# Patient Record
Sex: MALE | Race: WHITE | NOT HISPANIC OR LATINO | Employment: FULL TIME | URBAN - METROPOLITAN AREA
[De-identification: names, ages, dates, MRNs, and addresses within clinical notes are randomized per-mention and may not be internally consistent; named-entity substitution may affect disease eponyms.]

---

## 2017-05-31 ENCOUNTER — LAB VISIT (OUTPATIENT)
Dept: LAB | Facility: HOSPITAL | Age: 42
End: 2017-05-31
Attending: FAMILY MEDICINE
Payer: COMMERCIAL

## 2017-05-31 ENCOUNTER — OFFICE VISIT (OUTPATIENT)
Dept: FAMILY MEDICINE | Facility: CLINIC | Age: 42
End: 2017-05-31
Payer: COMMERCIAL

## 2017-05-31 VITALS
HEART RATE: 90 BPM | SYSTOLIC BLOOD PRESSURE: 100 MMHG | TEMPERATURE: 98 F | DIASTOLIC BLOOD PRESSURE: 86 MMHG | WEIGHT: 198.44 LBS | BODY MASS INDEX: 27.78 KG/M2 | HEIGHT: 71 IN

## 2017-05-31 DIAGNOSIS — R20.0 NUMBNESS AND TINGLING OF RIGHT HAND: ICD-10-CM

## 2017-05-31 DIAGNOSIS — R20.2 NUMBNESS AND TINGLING OF RIGHT HAND: Primary | ICD-10-CM

## 2017-05-31 DIAGNOSIS — R20.2 NUMBNESS AND TINGLING OF RIGHT HAND: ICD-10-CM

## 2017-05-31 DIAGNOSIS — R20.0 NUMBNESS AND TINGLING OF RIGHT HAND: Primary | ICD-10-CM

## 2017-05-31 LAB
ALBUMIN SERPL BCP-MCNC: 3.6 G/DL
ALP SERPL-CCNC: 81 U/L
ALT SERPL W/O P-5'-P-CCNC: 52 U/L
ANION GAP SERPL CALC-SCNC: 9 MMOL/L
AST SERPL-CCNC: 29 U/L
BASOPHILS # BLD AUTO: 0.03 K/UL
BASOPHILS NFR BLD: 0.4 %
BILIRUB SERPL-MCNC: 0.3 MG/DL
BUN SERPL-MCNC: 21 MG/DL
CALCIUM SERPL-MCNC: 9.6 MG/DL
CHLORIDE SERPL-SCNC: 101 MMOL/L
CO2 SERPL-SCNC: 27 MMOL/L
CREAT SERPL-MCNC: 1.1 MG/DL
DIFFERENTIAL METHOD: ABNORMAL
EOSINOPHIL # BLD AUTO: 0.2 K/UL
EOSINOPHIL NFR BLD: 2.6 %
ERYTHROCYTE [DISTWIDTH] IN BLOOD BY AUTOMATED COUNT: 12.9 %
EST. GFR  (AFRICAN AMERICAN): >60 ML/MIN/1.73 M^2
EST. GFR  (NON AFRICAN AMERICAN): >60 ML/MIN/1.73 M^2
FERRITIN SERPL-MCNC: 223 NG/ML
GLUCOSE SERPL-MCNC: 102 MG/DL
HCT VFR BLD AUTO: 44.5 %
HGB BLD-MCNC: 15.1 G/DL
IRON SERPL-MCNC: 55 UG/DL
LYMPHOCYTES # BLD AUTO: 1.6 K/UL
LYMPHOCYTES NFR BLD: 19.5 %
MCH RBC QN AUTO: 30.8 PG
MCHC RBC AUTO-ENTMCNC: 33.9 %
MCV RBC AUTO: 91 FL
MONOCYTES # BLD AUTO: 0.6 K/UL
MONOCYTES NFR BLD: 7.1 %
NEUTROPHILS # BLD AUTO: 5.9 K/UL
NEUTROPHILS NFR BLD: 70 %
PLATELET # BLD AUTO: 274 K/UL
PMV BLD AUTO: 8.9 FL
POTASSIUM SERPL-SCNC: 4.2 MMOL/L
PROT SERPL-MCNC: 8.1 G/DL
RBC # BLD AUTO: 4.9 M/UL
RETICS/RBC NFR AUTO: 1.1 %
SATURATED IRON: 15 %
SODIUM SERPL-SCNC: 137 MMOL/L
TOTAL IRON BINDING CAPACITY: 373 UG/DL
TRANSFERRIN SERPL-MCNC: 252 MG/DL
WBC # BLD AUTO: 8.4 K/UL

## 2017-05-31 PROCEDURE — 85045 AUTOMATED RETICULOCYTE COUNT: CPT

## 2017-05-31 PROCEDURE — 85025 COMPLETE CBC W/AUTO DIFF WBC: CPT

## 2017-05-31 PROCEDURE — 36415 COLL VENOUS BLD VENIPUNCTURE: CPT | Mod: PO

## 2017-05-31 PROCEDURE — 82607 VITAMIN B-12: CPT

## 2017-05-31 PROCEDURE — 99203 OFFICE O/P NEW LOW 30 MIN: CPT | Mod: S$GLB,,, | Performed by: NURSE PRACTITIONER

## 2017-05-31 PROCEDURE — 82728 ASSAY OF FERRITIN: CPT

## 2017-05-31 PROCEDURE — 80053 COMPREHEN METABOLIC PANEL: CPT

## 2017-05-31 PROCEDURE — 83540 ASSAY OF IRON: CPT

## 2017-05-31 PROCEDURE — 99999 PR PBB SHADOW E&M-NEW PATIENT-LVL III: CPT | Mod: PBBFAC,,, | Performed by: NURSE PRACTITIONER

## 2017-05-31 RX ORDER — CIPROFLOXACIN 500 MG/1
500 TABLET ORAL 2 TIMES DAILY
Refills: 0 | COMMUNITY
Start: 2017-05-26 | End: 2017-11-27

## 2017-05-31 RX ORDER — DIPHENOXYLATE HYDROCHLORIDE AND ATROPINE SULFATE 2.5; .025 MG/1; MG/1
1 TABLET ORAL 2 TIMES DAILY
Refills: 0 | COMMUNITY
Start: 2017-04-18

## 2017-05-31 NOTE — PROGRESS NOTES
"Subjective:       Patient ID: Al Baker is a 42 y.o. male.    Chief Complaint: Hand Pain (right hand pinkie and ring fingers)    Pt is new to this clinic and to this provider.  Pt here c/o numbness of right 4th and little finger for the past 3-4 days.  Pt states he had sx prior to starting Cipro on Monday, sx have not worsened.  Pt states that he has had this problem on an intermittent basis for a while.  No known trauma.  No previous neck injury.  Pt is RHD/ works in sales.  Pt states that he has had the sweats the past 3-4 nights none last night.  Pt denies any fever.  No n/v.    Pt does not have a Primary Care provider, has only seen GI         Past Medical History:   Diagnosis Date    Anxiety     previously treated    Asthma     intermittent no meds    Crohn disease     treated by Dr Smith at , on Remicade IV q6 weeks     Past Surgical History:   Procedure Laterality Date    COLON SURGERY       Social History     Social History Narrative    Works in sales    On Cipro from GI for Crohn's      History reviewed. No pertinent family history.  Vitals:    05/31/17 1607   BP: 100/86   Pulse: 90   Temp: 97.9 °F (36.6 °C)   Weight: 90 kg (198 lb 6.6 oz)   Height: 5' 11" (1.803 m)   PainSc:   3     Outpatient Encounter Prescriptions as of 5/31/2017   Medication Sig Dispense Refill    INFLIXIMAB (REMICADE IV) Inject into the vein. Once every 6 weeks      ciprofloxacin HCl (CIPRO) 500 MG tablet Take 500 mg by mouth 2 (two) times daily.  0    diphenoxylate-atropine 2.5-0.025 mg (LOMOTIL) 2.5-0.025 mg per tablet Take 1 tablet by mouth 2 (two) times daily.  0     No facility-administered encounter medications on file as of 5/31/2017.      Wt Readings from Last 3 Encounters:   05/31/17 90 kg (198 lb 6.6 oz)     Last 3 sets of Vitals    Vitals - 1 value per visit 5/31/2017   SYSTOLIC 100   DIASTOLIC 86   PULSE 90   TEMPERATURE 97.9   Weight (lb) 198.41   Weight (kg) 90   HEIGHT 5' 11"   BODY MASS INDEX " 27.67   VISIT REPORT -   Pain Score  3     No results found for: CBC  Review of Systems   Constitutional: Positive for diaphoresis. Negative for fatigue and fever.   Respiratory: Negative for cough.    Skin: Negative for rash.   Neurological: Positive for numbness. Negative for headaches.       Objective:      Physical Exam   Constitutional: He is oriented to person, place, and time. He appears well-developed and well-nourished. No distress.   HENT:   Head: Normocephalic and atraumatic.   Pulmonary/Chest: Effort normal.   Musculoskeletal:        Right hand: He exhibits normal range of motion, no tenderness, no bony tenderness, normal capillary refill, no deformity, no laceration and no swelling. Decreased sensation noted. Decreased sensation is present in the ulnar distribution. Normal strength noted.   Neg Steve test  Gross motor intact  Neg Phalen's and Tinel's     Neurological: He is alert and oriented to person, place, and time.   Skin: Skin is warm and dry. He is not diaphoretic.   Nursing note and vitals reviewed.      Assessment:       1. Numbness and tingling of right hand        Plan:       Will check labs , ? B12 deficiency  KIKO for records from Dr Smith  Needs to have an annual and establish care with PCP of choice  Will contact pt with results when available  Pt has f/u scheduled with GI for next week, encouraged to go sooner for any fever/ chills.  Discussed poss tendinopathy with Shakila Melendez was seen today for hand pain.    Diagnoses and all orders for this visit:    Numbness and tingling of right hand  -     EMG- 1 EXTREMITY; Future  -     Nerve conduction test; Future  -     CBC W/ AUTO DIFFERENTIAL; Future  -     COMPREHENSIVE METABOLIC PANEL; Future  -     IRON AND TIBC; Future  -     Vitamin B12 Deficiency Panel; Future  -     RETICULOCYTES; Future  -     FERRITIN; Future      Patient Instructions   Labs today    Start an over the counter B Complex vitamin    I will call you or send  you a message with the results    Schedule your nerve conduction tests at the  with Maryan

## 2017-05-31 NOTE — PATIENT INSTRUCTIONS
Labs today    Start an over the counter B Complex vitamin    I will call you or send you a message with the results    Schedule your nerve conduction tests at the  with Maryan

## 2017-06-02 LAB — VIT B12 SERPL-MCNC: 565 NG/L

## 2017-07-21 ENCOUNTER — PROCEDURE VISIT (OUTPATIENT)
Dept: NEUROLOGY | Facility: CLINIC | Age: 42
End: 2017-07-21
Payer: COMMERCIAL

## 2017-07-21 DIAGNOSIS — R20.0 NUMBNESS AND TINGLING OF RIGHT HAND: ICD-10-CM

## 2017-07-21 DIAGNOSIS — R20.2 NUMBNESS AND TINGLING OF RIGHT HAND: ICD-10-CM

## 2017-07-21 PROCEDURE — 95911 NRV CNDJ TEST 9-10 STUDIES: CPT | Mod: S$GLB,,, | Performed by: NEUROMUSCULOSKELETAL MEDICINE & OMM

## 2017-07-21 PROCEDURE — 95886 MUSC TEST DONE W/N TEST COMP: CPT | Mod: S$GLB,,, | Performed by: NEUROMUSCULOSKELETAL MEDICINE & OMM

## 2017-07-21 NOTE — PROCEDURES
EMG Needle exam performed by me.  Nerve conduction studies were also performed by me without the assistance of the technician

## 2017-09-29 ENCOUNTER — TELEPHONE (OUTPATIENT)
Dept: GASTROENTEROLOGY | Facility: CLINIC | Age: 42
End: 2017-09-29

## 2017-09-29 NOTE — TELEPHONE ENCOUNTER
----- Message from Mayelin Guadarrama sent at 9/29/2017  8:59 AM CDT -----  Contact: [facility] stuart Martin MD is referring pt to Dr Moreno.     Dx Crohns and other multi issues     The refrl, records are in media. Can you plz ctc pt to schedule.     Thanks  Mayelin Guadarrama   [facility]    [id number]

## 2017-10-20 ENCOUNTER — TELEPHONE (OUTPATIENT)
Dept: GASTROENTEROLOGY | Facility: CLINIC | Age: 42
End: 2017-10-20

## 2017-10-20 NOTE — TELEPHONE ENCOUNTER
----- Message from Mayelin Guadarrama sent at 10/20/2017  8:16 AM CDT -----  Contact: [facility]    Good morning,     I scanned in more pt records to media mgr.     Can you advise appt update once you get a chance? Pending for 3 weeks.    Thanks alex sandovalch.    [facility] [employer]   [id number]   ----- Message -----  From: Mayelin Guadarrama  Sent: 10/6/2017   2:09 PM  To: [facility] Staff    Hi Dr Moreno and staff,     Can you plz advise update on pt appt?     Thank you,  [facility] [employer]   [id number]     Mayelin Guadarrama sent to P [facility] Staff  Caller: Mayelin [facility] (1 week ago,  8:59 AM)         Dr Phi Martin MD is referring pt to Dr Mroeno.     Dx Crohns and other multi issues     The refrl, records are in media. Can you plz ctc pt to schedule.     Thanks   [facility] [employer]   [id number]

## 2017-10-23 ENCOUNTER — TELEPHONE (OUTPATIENT)
Dept: GASTROENTEROLOGY | Facility: CLINIC | Age: 42
End: 2017-10-23

## 2017-10-24 ENCOUNTER — TELEPHONE (OUTPATIENT)
Dept: GASTROENTEROLOGY | Facility: CLINIC | Age: 42
End: 2017-10-24

## 2017-10-24 NOTE — TELEPHONE ENCOUNTER
Patient is scheduled for a new patient clinic appointment on 10/30/2017 with Dr. GIFTY Moreno. Referring MD: SIDNEY Martin's staff was notified via e-mail.    Appointment reminder, new patient welcome letter, as well as a campus map e-mailed to patient.

## 2017-10-27 NOTE — PROGRESS NOTES
[facility]          Inflammatory Bowel Disease New Patient Consultation Note            Dr. Tricia Moreno    TODAY'S VISIT DATE:  10/27/2017    Reason for Consult:    Chief Complaint   Patient presents with    Crohn's Disease     PCP: Phi Martin  [address] / [place] [address]    Referring MD:   Dr. Phi Martin    History of Present Illness:    Dear. Dr. Phi Martin    Thank you for requesting a consultation on your patient Al Baker who is a 42 y.o. male seen today at the [facility] on 10/27/2017 for inflammatory bowel disease- Crohn's disease.  Pertinent past medical history includes psoriasis of elbows (diagnosed mid 10/2017)    As you know, Al Baker was doing well until 1996 when he developed bloody stool and was diagnosed by colonoscopy in [place]. He had prednisone few times per year but no maintenance medications for his UC and the prednisone helped. He felt this may have been due to his own noncompliance.  In 2/2005 symptoms worsened with bloody diarrhea, abdominal pain, weight loss and was admitted to [facility].  He was given IVFs but no treatment and went to  and had severe abdominal pain with toxic megacolon with acute perforation confirmed by CT.  On 2/17/05 patient underwent a total abdominal colectomy with Vera pouch with end ileostomy. Pathology showed chronic ulcerative with focally active state and appendix with inflammatory changes c/w with UC.  He was hospitalized for about 45 days with imaging suggesting some ileus and CT on 3/2005 showing right paracolic gutter and rectovesical pouch fluid collection. On 3/2/05 he had a exploratory laparatomy eval of abscesses and endoloop abscesses. He was placed on ancef and flagyl postoperatively and was being treated for abscesses and fevers. In 7/2005 he had proctectomy and creating of J pouch. Then on 9/30/05 he had loop  "ileostomy takedown with SB resection (op report not available, info from clinic notes).      For the most part from 2005 to 2015 he had flare of "pouchitis" with diarrhea, bloating, abd cramping and on average about once a year required a short course of antibiotics (flagyl) which usually resolved the sypmtoms quickly.  In the summer of 2015 he had flagyl but got a rash and had to take prednisone to resolve the rash.  He took cipro which helped but not as well as flagyl.  His son was born 2/19/15 which was stressful due to wife having medical issues and son being premature which created increase amount of stress.  AT this same time his symptoms worsened but at that time he had fevers/chills and ongoing change in bowel habits with looser stool.  He took a few courses of ciprofloxacin and a few courses of prednisone which abated your symptoms but not completely resolve them.  In the fall 2016 he had rectal pain and initially diagnosed with hemorrhoids and recommended to take cream and sitz baths.  He had some frequent plane trips for work in October/november 2016.  By 12/2016 he had continued rectal pain and leakage of pus and blood and suspicion for perianal abscess.     Also had hip pain and cold sweats at the time. He had a flex sig the same day as his clinic appt on 12/7/16 which showed significant ulceration and bleeding in the J pouch body involving the anastomosis and neoterminal ileum (per notes 30 cm).  He started remicade 2/2017 and has had 6 infusions with last one on 10/13/17.  His infusions have decreased in frequency to every 6 weeks and currently he is on 5 mg/kg every 6 weeks. He had a fistula that closed by 3/2017 but reopened by 4/2017.  In 5/2017 he had recurrent perianal abscess and started on ciprofloxacin though no improvement. In 7/2017 he saw CRS, Dr. Orantes and the abscess was incised and drained in the office.  MRI abd/pelvis on 7/29/17 showed right anterolateral perianal fistula terminating " "in the abscess int he anteromedial right gluteal fold.  Patient saw Dr. Pinedo for a surgical opinion in 8/2017.  He had EUA on 8/1/17 with Dr. Pinedo at which time the abscess was drained and two setons were placed.     Since 8/2017 he has had recurrent abscesses 3 times that have been conservatively treated with antibiotics including bactrim twice and now on augmentin (on D# 9 of 14 today).  With his infusion in early 2017 immediately after completion of the infusion he had eye swelling which resolved after taking benadryl.  On 10/13 he had IV iinfiltrated and then when IV placed in the other arm he started remicade. He almost immediately became "hot", SOB and this resolved with benadryl and steroids and this resolved his symptoms.     Currently patient reports average of 8 BMs/day of varying consistency with 3 nocturnal bowel movements and 1 with blood. He saw a dermatologist recently for psoriasis of his elbows and taking topical therapy. He has good appetite and he has stable weight for past few mos but had some significant weight loss of 10 pounds in early 2017. He has had some situational anxiety and depression.  He has occasional nausea/vomiting but this has only occurred on 2 occasions with last one 4 mos ago. He has joint pains of both shoulders and some lower back pain.  He takes ibuprofen once a week for headaches. He has pain with the recurrent abscesses and uses this only occasionally and hasnt taken any in 2 weeks. Some numbness in feet and hand neuropathy resolved.  Patient has no other gastrointestinal/constitutional complaints including no fevers or chills, dysphagia, abdominal pain.  Also patient does not have any extraintestinal manifestations of their inflammatory bowel disease including no eye pain/redness and no oral ulcers.    Prior Pertinent Surgeries:   2/17/2005: Total abdominal colectomy with Vera pouch and ileostomy (path: chronic ulcerative colitis, focally in an active state; " benign reactive lymph nodes; margins clear of significant pathology; appendix with focal inflammatory changes consistent with UC; no dysplasia)  3/2/2005: ex lap eval of abscesses and endoloop abscesses (path-peritoneal rind tissue: multiple fragments of fibron mixed with acute inflammatory cell exudate and fibrofatty connective tissue)  2005: take down closure of loop ileostomy, small bowel resection  2017: EUA with placement of seton Xs 2    Pertinent Endoscopy/Imagin2005 CT abd/pelvis: UC involvement throughout the colon from the cecum to the distal descending colon at the junction of the sigmoid colon; not as much changes seen involving the sigmoid colon and the rectum is seen when compared to the rest of the colon   2005 KUB: gas within transverse colon, descending colon, and sigmoid colon with not as much intestinal gas seen when compared to previous study which could be related to decrease or resolving ileus; transverse and sigmoid demonstrate findings which could be related to UC  2005 KUB: gaseous distention of large and small bowel loops with air fluid levels, more numerous than on the previous exam  3/1/2005 CT abd/pelvis: s/p total colectomy and RLQ enterostomy; right paracolic gutter and rectovesical pouch fluid collection  2016 Flex Sig: continuous area of bleeding ulcerated mucosa with stigmata of recent bleeding present in the pouch; a continuous area of ulcerated mucosa with no stigmata of recent bleeding at the anastomosis and neoterminal ileum to 30 cm; healing perirectal abscess (path-neoterminal ileum: chronic active ileitis with ulceration)  2017 MRI abd/pelvis: right anterolateral perianal fistula terminating in abscess in the anteromedial right gluteal fold    Pertinent Labs:  2017: BUN 10, Creatinine 1.12, Albumin 3.4, Total bili 0.2, alk phos 65, AST 22, ALT 27, WBC 9.7, RBC 4.47, Hgb 13.9, Hct 41.3, MCV 92.5, platelets 278, CRP 1.95 (<0.80)  No  results found for: STOOLCULTURE, [id number], [id number], CDIFFICILEAN, CDIFFTOX, CDIFFICILEBY  No results found for: CRP  No results found for: JBAMHLQK47CG  No results found for: HEPBIGM, HEPBCAB, HEPBSURFABQU  No results found for: VARICELLAZOS, VARICELLAINT  No results found for: NIL, TBAG, TBAGNIL, MITOGENNIL, TBGOLD  No results found for: TPMTRESULT  No results found for: ANSADAINIT, INFLIXIMAB, INFLIXINTERP    Prior IBD Therapies:  Flagyl (allergy)  Cipro  Prednisone    Current IBD Therapies:  Remicade 5 mg/kg every 6 weeks    Vaccinations: Patient to bring immunization records to next clinic visit  Flu shot: hasn't had one in 2017  Chickenpox status/Varicella vaccine: had chickenpox as a child  No results found for: VARICELLAZOS, VARICELLAINT  Tetanus: 2017  Prevnar 13 vaccine: not sure  Pneumovax 23 vaccine: not sure  Hepatitis B: had vaccine  No results found for: HEPBSAB  Hepatitis A: had vaccine  HPV: NA   Meningococcal: NA     NSAID use/indication:  Ibuprofen once a week    Narcotic use:  Percocet 10/325 mg prn- none in the last 2 weeks     Alternative/Complementary Meds for IBD:  Probiotics, MVI, vit C, vitamins to boost immune system    Review of Systems   Constitutional: Positive for chills and weight loss. Negative for fever.   HENT:        No oral ulcers, dysphagia, oral thrush   Eyes: Negative for blurred vision, pain and redness.   Respiratory: Positive for shortness of breath. Negative for cough.    Cardiovascular: Negative for chest pain.   Gastrointestinal: Positive for heartburn, nausea and vomiting. Negative for abdominal pain.   Genitourinary: Negative for dysuria and hematuria.   Musculoskeletal: Positive for back pain. Negative for joint pain.   Skin: Positive for rash.   Psychiatric/Behavioral: Positive for depression. The patient is nervous/anxious. The patient does not have insomnia.      Medical/Surgical History:    has a past medical history of Anxiety; Asthma; Crohn disease;  Crohn's disease (8/16); Food intolerance (2000); GERD (gastroesophageal reflux disease) (1993); Inflammatory bowel disease (1995); Psoriasis; and Ulcerative colitis (5/95).   has a past surgical history that includes Colon surgery; Abscess drainage (8/17); Colon surgery (8/05); Ileostomy (12/05); Colonoscopy (7/16); Colostomy (6/05); and Revision Colostomy (4/06).    Family History: family history includes Inflammatory bowel disease in his brother; Psoriasis in his father; Rheum arthritis in his father; Ulcerative colitis in his mother.    Social History:  reports that he has been smoking Cigarettes.  He has never used smokeless tobacco. He reports that he drinks alcohol. He reports that he uses drugs, including Marijuana.    Review of patient's allergies indicates:   Allergen Reactions    Flagyl [metronidazole hcl] Hives       Outpatient Prescriptions Marked as Taking for the 10/30/17 encounter (Office Visit) with Tricia Moreno MD   Medication Sig Dispense Refill    amoxicillin-clavulanate 875-125mg (AUGMENTIN) 875-125 mg per tablet Take 1 tablet by mouth every 12 (twelve) hours.      INFLIXIMAB (REMICADE IV) Inject into the vein. Once every 6 weeks      loperamide (IMODIUM) 2 mg capsule Take 6 mg by mouth 2 (two) times daily as needed for Diarrhea.      [DISCONTINUED] amoxicillin (AMOXIL) 875 MG tablet Take 875 mg by mouth every 12 (twelve) hours.         Vital Signs:  /75 (BP Location: Left arm, Patient Position: Sitting)   Pulse 108 Comment: O2: 100%  Temp 97.9 °F (36.6 °C)   Ht 6' (1.829 m)   Wt 84.5 kg (186 lb 4.6 oz)   BMI 25.27 kg/m²     Physical Exam   Constitutional: He is oriented to person, place, and time. He appears well-developed.   HENT:   Mouth/Throat: No oral lesions.   Eyes: Conjunctivae and EOM are normal. Pupils are equal, round, and reactive to light.   Cardiovascular: Normal rate and regular rhythm.    Pulmonary/Chest: Effort normal and breath sounds normal.   Abdominal:  Soft. There is no tenderness.   Genitourinary:   Genitourinary Comments: No active abscess, 2 setons in place   Musculoskeletal:        Right lower leg: He exhibits no swelling.        Left lower leg: He exhibits no swelling.   Neurological: He is alert and oriented to person, place, and time.   Skin: Rash noted.   Small scaly patches to bilateral elbows   Psychiatric: He has a normal mood and affect.   Nursing note and vitals reviewed.    Labs:  No results found for: STOOLCULTURE, [id number], [id number], CDIFFICILEAN, CDIFFTOX, CDIFFICILEBY  Lab Results   Component Value Date    WBC 8.40 05/31/2017    HGB 15.1 05/31/2017    HCT 44.5 05/31/2017    MCV 91 05/31/2017     05/31/2017    ALT 52 (H) 05/31/2017    AST 29 05/31/2017    ALKPHOS 81 05/31/2017    BILITOT 0.3 05/31/2017     No results found for: HEPBIGM, HEPBCAB, HEPBSAB, HEPBSURFABQU  No results found for: VARICELLAZOS, VARICELLAINT  No results found for: NIL, TBAG, TBAGNIL, MITOGENNIL, TBGOLD  No results found for: TPMTRESULT  No results found for: ANSADAINIT, INFLIXIMAB, INFLIXINTERP    Assessment/Plan:  Al Baker is a 42 y.o. male with initial history of Ulcerative Colitis now with Crohn's disease of the J pouch s/p 3 step restorative proctocolectomy with IPAA (2/17/2005, 7/2005, 9/30/2005) due to toxic megacolon with acute perforation.  He has a past medical history of psoriasis of elbows (diagnosed mid 10/2017).  He began with symptoms of bloody diarrhea in 1996 and was diagnosed via colonoscopy in [place] (we do not have these records).  He was on no maintenance medications at that time due to admitted non-compliance and recalls being on prednisone courses a few times a year which helped.  In 2/2005 his symptoms worsened with bloody diarrhea, abdominal pain, and weight loss but was treated conservatively.  48 hours later, he presented to [facility] with severe abdominal pain and was diagnosed with toxic megacolon with acute  "perforation confirmed by CT scan.  On 2/17/05 patient underwent a total abdominal colectomy with Vera pouch with end ileostomy. Pathology showed chronic ulcerative with focally active state and appendix with inflammatory changes c/w with UC.  He hospital stay was approximately 45 days and complications included ileus and a right paracolic gutter and rectovesical pouch fluid collection confirmed on CT in 3/2005 requiring an ex lap for eval of abscesses and endoloop abscesses.  These were treated with ancef and flagyl post-op.  In 7/2005 he underwent his second stage of the operation creating the J pouch and a proctectomy.  Then on 9/30/2005, he had a ileostomy takedown with SB resection (op report not available, info from clinic notes).  He did well from [id number] except for yearly minor flares of "pouchitis" with symptoms of diarrhea, bloating, and abdominal cramping that was treated with a short course of flagyl.  This treatment resolved his symptoms quickly.  Unfortunately in summer of 2015, he developed an allergy to flagyl (rash) that had to be treated with prednisone.  He has taken cipro for his pouchitis symptoms but this has not worked as well as flagyl.  In 2/2015 had a significant amount of stressors due to wife having medical issues and son being premature causing worsening symptoms that was abated with a few course of cipro and prednisone though his symptoms never completely resolved.  In fall 2016, he began with some rectal pain that was thought to be due to hemorrhoids and was recommended to take cream and sitz baths.  By 12/2016 he had continued rectal pain, anal leakage of pus and blood, hip pain, and chils causing a suspicion for a perianal abscess. He had a flex sig the same day as his clinic appt on 12/7/16 which showed significant ulceration and bleeding in the J pouch body involving the anastomosis and neoterminal ileum (per notes 30 cm).  He started remicade 5 mg/kg with induction followed by " "maintenance in 2/2017 and has completed 6 infusions with his last on 10/13/2017.  He was having symptoms 2 weeks prior to a remicade infusion so his frequency was decreased to every 6 weeks remaining at 5 mg/kg.  He had some swelling to his eye after a remicade infusion that was treated with PO benadryl and resolved.  His fistula closed in 3/2017 but reopened in 4/2017 and he had a recurrent perianal abscess in 5/2017 that was treated with Cipro.  It was incised and drained in the office by Dr. Orantes in 7/2017.   MRI abd/pelvis on 7/29/17 showed right anterolateral perianal fistula terminating in the abscess int he anteromedial right gluteal fold.  He was referred to Dr. Chavez for a surgical opinion in 8/2017 and had an EUA with abscess drainage and placement of 2 setons on 8/1/2017.  Since the EUA and seton placement, he has had 3 recurrent abscesses that has been treated with 2 courses of Bactrim and current treatment of Augmentin.  With his remicade infusion on 10/13/2017, he had immediate SOB and "hot" feeling after his initial IV infiltrated and was restarted in the opposite arm.  This reaction was treated with IV benadryl and IV steroids and the symptoms resolved.  Currently patient is having 8 BMs/day of varying consistency with 3 nocturnal BMs and 1 containing blood.  He has no active abscess at present and continues on his Augmentin course.      Patient has Crohn's disease of the J pouch with setons in place due to recurrent perianal abscesses.  Patient has been on remicade since 2/2017 and has optimized dosing to 5 mg/kg every 6 weeks though recently had reaction to the remicade that resolved after steroids and benadryl were given.  Since EUA with seton placement in 8/2017 he has had symptoms of recurrent abscess though evaluation not done he took 3 courses of antibiotics for this which resolve symptoms and currently on augmentin course.  He has no abscess on exam today. I would like to do remicade " levels/abs in about 2 weeks (4 weeks after last dose and 2 weeks before next dose) and if low levels with no abs we may consider increasing dose with extensive premeds but if significant abs he may benefit from Humira.  I will plan on doing a pouchoscopy to restage his disease now and repeat depending on treatment plan/response. I did advise patient to try to get remicade in a hospital setting due to recent reaction and they don't have oxygen at [facility] and he will speak to Dr. Martin regarding this.  In addition he was told to let me know if he has any recurrent abscess so we can assess this. Of note he has developed psoriasis possibly from the remicade but it is mild and being treated by dermatology so no need to stop remicade for this reason.     # Crohn's disease of the J pouch (Initial history of Ulcerative Colitis now with Crohn's disease of the J pouch s/p 3 step restorative proctocolectomy with IPAA (2/17/2005, 7/2005, 9/30/2005) due to toxic megacolon with acute perforation):    - I had a long discussion with patient regarding epidemiology, potential causes/triggers (avoiding NSAIDS, antibiotics if possible, stress and smoking), diagnosis, management goals and treatment options   - continue remicade 5 mg/kg every 6 weeks--recommend premeds 30 minutes prior to remicade infusion--Benadryl 25 mg IV, Hydrocortisone 200 mg IV, and Tylenol 650 mg PO with slow drug rate increase  - [facility] IFX Levels and ABs to be drawn on 11/10/2017 (4 weeks after remicade infusion and 2 weeks before next infusion  - pouchoscopy within next 1-2 weeks  - stop smoking given smoking can worsen Crohn's and make him more resistant to Crohn's related treatments  - basic labs: CBC, CMP, ESR, CRP, vitamin B12, vitamin D, HIV, Hep C  - drug monitoring labs: TB Quantiferon today, TPMT today, Hepatitis testing today    # Diarrhea stool of varying consistency:   - infectious causes unlikely due to varying consistency and stable symptoms  so cultures and c diff--not ordered  - TTG IgA and total serum IgA to assess for celiac disease  - TSH  to assess for thyroid disease  - pouchoscopy to rule out CMV    # Perianal abscess  - no active abscess at present  - 2 setons in place  - continue Augmentin as prescribed  - call immediately if abscess returns, will consider MRI pelvis for evaluation  - use oxycodone sparingly--discussed the increase of mortality in Crohn's patients with narcotic use    # Psoriasis possibly remicade induced but mild and being treated  - 2 small patches on bilateral elbows  - continue topical treatment per derm  - continue derm f/u    # Smoker:   - recommended to stop and come up with a plan prior to next visit  - discussed in detail that Crohn's disease can worsen with smoking and may make patient more resistant to treatment    # IBD specific health maintenance-on immunosuppression:  Colorectal cancer risk:    Risks factors: none-average risk  - colonoscopy:  periodic surveillance of rectal cuff    Opthamologic exam recommended yearly    Dermatologic exam recommended yearly     Bone health:  Risk of osteopenia/osteoporosis:  Risks factors: steroid use > 3 months  Vitamin D: vitamin D level ordered today  No results found for: [id number]    Vaccine counseling:bring your vaccination records to next clinic appointment  - No LIVE VACCINES--reminded in clinic today  - VZV IgG, HBsAb today     Follow up: with Dr. Moreno 10 days after [facility] levels around 11/20/17    Thank you again for sending Al Baker to see Dr. Tricia Moreno today at the [facility]. Please don't hesitate to contact Dr. Moreno if there are any questions regarding this evaluation, or if you have any other patients with inflammatory bowel disease for whom you would like a consultation. You can reach Dr. Moreno at [phone] or by email at [email or web address]    Tricia Moreno MD  Department of Gastroenterology  Medical  Director, Inflammatory Bowel Disease    Litzy Irving, DAVIDP-C   Department of Gastroenterology  Inflammatory Bowel Disease

## 2017-10-30 ENCOUNTER — TELEPHONE (OUTPATIENT)
Dept: ENDOSCOPY | Facility: HOSPITAL | Age: 42
End: 2017-10-30

## 2017-10-30 ENCOUNTER — OFFICE VISIT (OUTPATIENT)
Dept: GASTROENTEROLOGY | Facility: CLINIC | Age: 42
End: 2017-10-30
Payer: COMMERCIAL

## 2017-10-30 VITALS
SYSTOLIC BLOOD PRESSURE: 119 MMHG | TEMPERATURE: 98 F | WEIGHT: 186.31 LBS | BODY MASS INDEX: 25.24 KG/M2 | DIASTOLIC BLOOD PRESSURE: 75 MMHG | HEART RATE: 108 BPM | HEIGHT: 72 IN

## 2017-10-30 DIAGNOSIS — R19.7 DIARRHEA, UNSPECIFIED TYPE: ICD-10-CM

## 2017-10-30 DIAGNOSIS — K50.014 CROHN'S DISEASE OF SMALL INTESTINE WITH ABSCESS: Primary | ICD-10-CM

## 2017-10-30 DIAGNOSIS — K50.013 CROHN'S DISEASE OF ILEUM WITH FISTULA: ICD-10-CM

## 2017-10-30 DIAGNOSIS — Z79.60 LONG-TERM USE OF IMMUNOSUPPRESSANT MEDICATION: ICD-10-CM

## 2017-10-30 DIAGNOSIS — L98.8 FISTULA: ICD-10-CM

## 2017-10-30 DIAGNOSIS — L40.9 PSORIASIS: ICD-10-CM

## 2017-10-30 DIAGNOSIS — F17.200 SMOKER: ICD-10-CM

## 2017-10-30 PROCEDURE — 99245 OFF/OP CONSLTJ NEW/EST HI 55: CPT | Mod: S$GLB,,, | Performed by: INTERNAL MEDICINE

## 2017-10-30 PROCEDURE — 99999 PR PBB SHADOW E&M-EST. PATIENT-LVL III: CPT | Mod: PBBFAC,,, | Performed by: INTERNAL MEDICINE

## 2017-10-30 RX ORDER — AMOXICILLIN 875 MG/1
875 TABLET, FILM COATED ORAL
COMMUNITY
End: 2017-10-30

## 2017-10-30 RX ORDER — AMOXICILLIN AND CLAVULANATE POTASSIUM 875; 125 MG/1; MG/1
1 TABLET, FILM COATED ORAL EVERY 12 HOURS
COMMUNITY
End: 2017-11-27

## 2017-10-30 RX ORDER — LOPERAMIDE HYDROCHLORIDE 2 MG/1
6 CAPSULE ORAL 2 TIMES DAILY PRN
COMMUNITY

## 2017-10-30 RX ORDER — DIPHENOXYLATE HYDROCHLORIDE AND ATROPINE SULFATE 2.5; .025 MG/1; MG/1
1 TABLET ORAL 4 TIMES DAILY PRN
Qty: 120 TABLET | Refills: 0 | Status: SHIPPED | OUTPATIENT
Start: 2017-10-30 | End: 2017-11-27 | Stop reason: SDUPTHER

## 2017-10-30 NOTE — LETTER
October 30, 2017      Phi Martin MD  [address]  [place] [address]           [facility]  [address] [address]  Phone: [phone]  Fax: [phone]          Patient: Al Baker   MR Number: [id number]   YOB: 1975   Date of Visit: 10/30/2017       Dear Dr. Phi Martin:    Thank you for referring Al Baker to me for evaluation. Attached you will find relevant portions of my assessment and plan of care.    If you have questions, please do not hesitate to call me. I look forward to following Al Baker along with you.    Sincerely,    Tricia Moreno MD    Enclosure  CC:  No Recipients    If you would like to receive this communication electronically, please contact [email or web address] or [phone] to request more information on [employer] access.    For providers and/or their staff who would like to refer a patient to [facility], please contact us through our one-stop-shop provider referral line, [facility], at [phone].    If you feel you have received this communication in error or would no longer like to receive these types of communications, please e-mail [email or web address]

## 2017-10-30 NOTE — PATIENT INSTRUCTIONS
Instructions:  - labs today  - pouchoscopy within next 1-2 weeks  - [facility] Levels and ABs to be drawn on 11/10/2017--call us to make sure we have your results prior to your clinic visit  - plan to order MRI pelvis if/when abscess returns--email/call us immediately if abscess returns   - continue remicade 5 mg/kg every 6 weeks, due 11/24/2017  - continue Augmentin  - low fiber/low residue diet  - try to stop smoking  - use oxycodone sparingly  - should get premeds 30 minutes prior to remicade infusion--Benadryl 25 mg IV, Hydrocortisone 200 mg IV, and Tylenol 650 mg PO with slow drug rate increase  - Avoid all NSAIDs (Advil, Ibuprofen, Motrin, Aspirin, Naprosyn, Aleve)--take Tylenol preferentially   - Yearly Eye exam  - Yearly Skin exam--wear sun block and hats  - Use antibiotics with caution  - Vaccines recommended--bring you vaccination records to next clinic appointment  Flu shot yearly  Tetanus every 10 years  Hepatitis B series (three injections)  Pneumonia 13  (PCV13) vaccine initial  Pneumonia 23 (PPSV23) vaccine 1 year after PCV13, then an additional dose in 5 years, then again at age 67  - Follow up with Dr. Moreno 10 days after [facility] levels        Infliximab (Remicade): Biologics - Anti-TNF Agent    - Mechanism of action:  Remicade blocks TNF alpha which plays a role in the inflammatory process for inflammatory bowel disease  - Labs     - Repeat labs should be drawn every 6 months to monitor for side effects    Remicade Dosage:  - 5 mg/kg every 6 weeks  - make sure you get premeds as above prior to your next infusion if we decide to continue this    Risks of Remicade:  Stop therapy due to adverse event= 10% (10/100)    Please call us immediately if you develop any of the below problems    Allergic reactions  - <2% (2/100) develop infusion site reactions  - RARE- hives (rash), difficulty breathing, chest pain/tightness, high or low blood pressure, swelling of the face and hands, fever or chills    Serious  Infections= 3% (3/100)  fever, tiredness, flu, open sores, warm red painful skin    Blood Disorders  fever that doesn't go away, bruising, bleeding, severe paleness    Non-Hodgkins Lymphoma=0.06% (6/10,000)    Drug related lupus-like reaction= 1% (1/100)  chest discomfort or pain that does not go away, shortness of breath, joint pain, rash on the cheeks or arms that gets worse in the sun    Psoriasis  new or worsening red scaly patches or raised bumps on the skin that are filled with pus     Case Reports Only: Multiple Sclerosis and Guillain [place] Syndrome  Numbness/weakness/tingling of your hands and feet, changes in your vision or seizures    Case Reports Only: New or worsening Congestive Heart Failure  shortness of breath, swelling in your ankles or feet, sudden weight gain    Case Reports Only: Serious Liver Injury  jaundice (yellow skin or eyes), dark brown urine, right-sided abdominal pain, fever, severe itchiness    Vaccine Counseling  See above    - All Immunizations ideally should be up to date prior to starting therapy--NO LIVE vaccines during therapy or 8 weeks prior to therapy  - Live Vaccines Include:   --Intranasal Influenza A/B  --Measles, Mumps, Rubella (MMR)  --Rotavirus  --Typhoid (oral)  --Vaccina (Smallpox)  --Varicella (Chicken Pox)  --Yellow Fever  --Zoster

## 2017-11-01 ENCOUNTER — TELEPHONE (OUTPATIENT)
Dept: GASTROENTEROLOGY | Facility: CLINIC | Age: 42
End: 2017-11-01

## 2017-11-01 NOTE — TELEPHONE ENCOUNTER
Called and spoke with pt  Got his TB Gold scheduled for 11/07 and I rescheduled his other labs for same day instead of him coming back on 11/10

## 2017-11-01 NOTE — TELEPHONE ENCOUNTER
----- Message from Kelly Lawton sent at 11/1/2017  3:40 PM CDT -----  Contact: Pt  Pt is calling to speak with nurse in regards to TB shot and can can be reached at [phone].    Thank you

## 2017-11-07 ENCOUNTER — LAB VISIT (OUTPATIENT)
Dept: LAB | Facility: HOSPITAL | Age: 42
End: 2017-11-07
Attending: INTERNAL MEDICINE
Payer: COMMERCIAL

## 2017-11-07 DIAGNOSIS — K50.014 CROHN'S DISEASE OF SMALL INTESTINE WITH ABSCESS: ICD-10-CM

## 2017-11-07 PROCEDURE — 36415 COLL VENOUS BLD VENIPUNCTURE: CPT

## 2017-11-07 PROCEDURE — 86480 TB TEST CELL IMMUN MEASURE: CPT

## 2017-11-08 ENCOUNTER — SURGERY (OUTPATIENT)
Age: 42
End: 2017-11-08

## 2017-11-08 ENCOUNTER — HOSPITAL ENCOUNTER (OUTPATIENT)
Facility: HOSPITAL | Age: 42
Discharge: HOME | End: 2017-11-08
Attending: INTERNAL MEDICINE | Admitting: INTERNAL MEDICINE
Payer: COMMERCIAL

## 2017-11-08 ENCOUNTER — ANESTHESIA EVENT (OUTPATIENT)
Dept: ENDOSCOPY | Facility: HOSPITAL | Age: 42
End: 2017-11-08
Payer: COMMERCIAL

## 2017-11-08 ENCOUNTER — TELEPHONE (OUTPATIENT)
Dept: GASTROENTEROLOGY | Facility: CLINIC | Age: 42
End: 2017-11-08

## 2017-11-08 ENCOUNTER — ANESTHESIA (OUTPATIENT)
Dept: ENDOSCOPY | Facility: HOSPITAL | Age: 42
End: 2017-11-08
Payer: COMMERCIAL

## 2017-11-08 VITALS
OXYGEN SATURATION: 98 % | RESPIRATION RATE: 15 BRPM | HEART RATE: 78 BPM | WEIGHT: 185 LBS | HEIGHT: 72 IN | TEMPERATURE: 98 F | SYSTOLIC BLOOD PRESSURE: 107 MMHG | DIASTOLIC BLOOD PRESSURE: 65 MMHG | BODY MASS INDEX: 25.06 KG/M2

## 2017-11-08 VITALS — RESPIRATION RATE: 23 BRPM

## 2017-11-08 DIAGNOSIS — K50.014 CROHN'S DISEASE OF SMALL INTESTINE WITH ABSCESS: Primary | ICD-10-CM

## 2017-11-08 DIAGNOSIS — K50.00 CROHN'S DISEASE, SMALL INTESTINE: ICD-10-CM

## 2017-11-08 LAB
MITOGEN NIL: >10 IU/ML
NIL: 0.05 IU/ML
TB ANTIGEN NIL: -0.01 IU/ML
TB ANTIGEN: 0.04 IU/ML
TB GOLD: NEGATIVE

## 2017-11-08 PROCEDURE — 27201012 HC FORCEPS, HOT/COLD, DISP: Performed by: INTERNAL MEDICINE

## 2017-11-08 PROCEDURE — 25000003 PHARM REV CODE 250: Performed by: INTERNAL MEDICINE

## 2017-11-08 PROCEDURE — 37000008 HC ANESTHESIA 1ST 15 MINUTES: Performed by: INTERNAL MEDICINE

## 2017-11-08 PROCEDURE — 44386 ENDOSCOPY BOWEL POUCH/BIOP: CPT | Mod: ,,, | Performed by: INTERNAL MEDICINE

## 2017-11-08 PROCEDURE — D9220A PRA ANESTHESIA: Mod: CRNA,,, | Performed by: NURSE ANESTHETIST, CERTIFIED REGISTERED

## 2017-11-08 PROCEDURE — 44386 ENDOSCOPY BOWEL POUCH/BIOP: CPT | Performed by: INTERNAL MEDICINE

## 2017-11-08 PROCEDURE — D9220A PRA ANESTHESIA: Mod: ANES,,, | Performed by: ANESTHESIOLOGY

## 2017-11-08 PROCEDURE — 88305 TISSUE EXAM BY PATHOLOGIST: CPT | Mod: 26,,, | Performed by: PATHOLOGY

## 2017-11-08 PROCEDURE — 37000009 HC ANESTHESIA EA ADD 15 MINS: Performed by: INTERNAL MEDICINE

## 2017-11-08 PROCEDURE — 88342 IMHCHEM/IMCYTCHM 1ST ANTB: CPT | Mod: 26,,, | Performed by: PATHOLOGY

## 2017-11-08 PROCEDURE — 88305 TISSUE EXAM BY PATHOLOGIST: CPT | Performed by: PATHOLOGY

## 2017-11-08 PROCEDURE — 63600175 PHARM REV CODE 636 W HCPCS: Performed by: NURSE ANESTHETIST, CERTIFIED REGISTERED

## 2017-11-08 RX ORDER — LIDOCAINE HCL/PF 100 MG/5ML
SYRINGE (ML) INTRAVENOUS
Status: DISCONTINUED | OUTPATIENT
Start: 2017-11-08 | End: 2017-11-08

## 2017-11-08 RX ORDER — SODIUM CHLORIDE 9 MG/ML
INJECTION, SOLUTION INTRAVENOUS CONTINUOUS
Status: DISCONTINUED | OUTPATIENT
Start: 2017-11-08 | End: 2017-11-08 | Stop reason: HOSPADM

## 2017-11-08 RX ORDER — PROPOFOL 10 MG/ML
INJECTION, EMULSION INTRAVENOUS CONTINUOUS PRN
Status: DISCONTINUED | OUTPATIENT
Start: 2017-11-08 | End: 2017-11-08

## 2017-11-08 RX ORDER — ALPRAZOLAM 0.25 MG/1
0.5 TABLET ORAL 3 TIMES DAILY
COMMUNITY

## 2017-11-08 RX ORDER — PROPOFOL 10 MG/ML
INJECTION, EMULSION INTRAVENOUS
Status: DISCONTINUED | OUTPATIENT
Start: 2017-11-08 | End: 2017-11-08

## 2017-11-08 RX ORDER — MIDAZOLAM HYDROCHLORIDE 1 MG/ML
INJECTION, SOLUTION INTRAMUSCULAR; INTRAVENOUS
Status: DISCONTINUED | OUTPATIENT
Start: 2017-11-08 | End: 2017-11-08

## 2017-11-08 RX ORDER — DIAZEPAM 5 MG/1
10 TABLET ORAL EVERY 6 HOURS PRN
COMMUNITY
End: 2017-11-27

## 2017-11-08 RX ADMIN — PROPOFOL 200 MCG/KG/MIN: 10 INJECTION, EMULSION INTRAVENOUS at 11:11

## 2017-11-08 RX ADMIN — SODIUM CHLORIDE: 0.9 INJECTION, SOLUTION INTRAVENOUS at 11:11

## 2017-11-08 RX ADMIN — MIDAZOLAM HYDROCHLORIDE 2 MG: 1 INJECTION, SOLUTION INTRAMUSCULAR; INTRAVENOUS at 11:11

## 2017-11-08 RX ADMIN — PROPOFOL 100 MG: 10 INJECTION, EMULSION INTRAVENOUS at 11:11

## 2017-11-08 RX ADMIN — LIDOCAINE HYDROCHLORIDE 75 MG: 20 INJECTION, SOLUTION INTRAVENOUS at 11:11

## 2017-11-08 NOTE — ANESTHESIA POSTPROCEDURE EVALUATION
Anesthesia Post Evaluation    Patient: Al Baker    Procedure(s) Performed: Procedure(s) (LRB):  POUCHOSCOPY (N/A)    Final Anesthesia Type: general  Patient location during evaluation: GI PACU  Patient participation: Yes- Able to Participate  Level of consciousness: awake and alert  Post-procedure vital signs: reviewed and stable  Pain management: adequate  Airway patency: patent  PONV status at discharge: No PONV  Anesthetic complications: no      Cardiovascular status: blood pressure returned to baseline  Respiratory status: unassisted  Hydration status: euvolemic  Follow-up not needed.        Visit Vitals  /65   Pulse 78   Temp 36.5 °C (97.7 °F)   Resp 15   Ht 6' (1.829 m)   Wt 83.9 kg (185 lb)   SpO2 98%   BMI 25.09 kg/m²       Pain/Ginger Score: Pain Assessment Performed: Yes (11/8/2017 11:54 AM)  Presence of Pain: complains of pain/discomfort (11/8/2017 10:52 AM)  Ginger Score: 10 (11/8/2017 11:54 AM)

## 2017-11-08 NOTE — H&P
Short Stay Endoscopy History and Physical    PCP - Phi Martin MD  Referring Physician - Tricia Moreno MD  [address] [place] [address]    Procedure - pouchoscopy  ASA - per anesthesia  Mallampati - per anesthesia  History of Anesthesia problems - no  Family history Anesthesia problems -  no   Plan of anesthesia - General    HPI  42 y.o. male  Reason for procedure: Crohn's of the J pouch    ROS:  Constitutional: No fevers, chills, No weight loss  CV: No chest pain  Pulm: No cough, No shortness of breath  GI: see HPI    Medical History:  has a past medical history of Anxiety; Asthma; Crohn disease; Crohn's disease (8/16); Food intolerance (2000); GERD (gastroesophageal reflux disease) (1993); Inflammatory bowel disease (1995); Psoriasis; and Ulcerative colitis (5/95).    Surgical History:  has a past surgical history that includes Colon surgery; Abscess drainage (8/17); Colon surgery (8/05); Ileostomy (12/05); Colonoscopy (7/16); Colostomy (6/05); and Revision Colostomy (4/06).    Family History: family history includes Inflammatory bowel disease in his brother; Psoriasis in his father; Rheum arthritis in his father; Ulcerative colitis in his mother.. Otherwise no colon cancer, inflammatory bowel disease, or GI malignancies.    Social History:  reports that he has been smoking Cigarettes.  He has a 3.75 pack-year smoking history. He has never used smokeless tobacco. He reports that he drinks alcohol. He reports that he uses drugs, including Amphetamines and Marijuana, about 7 times per week.    Review of patient's allergies indicates:   Allergen Reactions    Flagyl [metronidazole hcl] Hives       Medications:   Prescriptions Prior to Admission   Medication Sig Dispense Refill Last Dose    amoxicillin-clavulanate 875-125mg (AUGMENTIN) 875-125 mg per tablet Take 1 tablet by mouth every 12 (twelve) hours.   Taking    ciprofloxacin HCl (CIPRO) 500 MG tablet Take 500 mg by mouth 2 (two) times  daily.  0 Not Taking    diphenoxylate-atropine 2.5-0.025 mg (LOMOTIL) 2.5-0.025 mg per tablet Take 1 tablet by mouth 2 (two) times daily.  0 Not Taking    diphenoxylate-atropine 2.5-0.025 mg (LOMOTIL) 2.5-0.025 mg per tablet Take 1 tablet by mouth 4 (four) times daily as needed for Diarrhea. 120 tablet 0     INFLIXIMAB (REMICADE IV) Inject into the vein. Once every 6 weeks   Taking    loperamide (IMODIUM) 2 mg capsule Take 6 mg by mouth 2 (two) times daily as needed for Diarrhea.   Taking       Physical Exam:    Vital Signs: There were no vitals filed for this visit.    General Appearance: Well appearing in no acute distress  Lungs: CTA anteriorly  Heart:  Regular rate, S1, S2 normal, no murmurs heard.  Abdomen: Soft, non tender, non distended with normal bowel sounds, no masses  Extremities: No edema    Labs:  Lab Results   Component Value Date    WBC 7.41 11/07/2017    HGB 14.8 11/07/2017    HCT 44.6 11/07/2017     11/07/2017    ALT 16 11/07/2017    AST 21 11/07/2017     11/07/2017    K 4.3 11/07/2017     11/07/2017    CREATININE 1.0 11/07/2017    BUN 12 11/07/2017    CO2 27 11/07/2017    TSH 3.085 11/07/2017       I have explained the risks and benefits of this endoscopic procedure to the patient including but not limited to bleeding, inflammation, infection, perforation, and death.      Tricia Moreno MD

## 2017-11-08 NOTE — ANESTHESIA PREPROCEDURE EVALUATION
11/08/2017  Al Baker is a 42 y.o., male.    Anesthesia Evaluation         Review of Systems  Anesthesia Hx:  No problems with previous Anesthesia   Social:  Smoker    Cardiovascular:   Exercise tolerance: good Denies Hypertension.  Denies CAD.     Denies Angina.  Functional Capacity Can you climb two flights of stairs? ==> Yes    Pulmonary:   Asthma Denies Recent URI.  Denies Sleep Apnea.    Renal/:  Renal/ Normal     Hepatic/GI:   Denies PUD. Denies Hiatal Hernia. GERD Denies Liver Disease.  Denies Hepatitis.    Neurological:   Denies CVA. Denies Seizures.    Endocrine:   Denies Diabetes. Denies Hypothyroidism.        Physical Exam  General:  Well nourished    Airway/Jaw/Neck:  Airway Findings: Mouth Opening: Normal Tongue: Normal  General Airway Assessment: Adult  Mallampati: I  TM Distance: Normal, at least 6 cm  Jaw/Neck Findings:  Neck ROM: Normal ROM  Neck Findings:     Eyes/Ears/Nose:  EYES/EARS/NOSE FINDINGS: Normal   Dental:  Dental Findings: In tact   Chest/Lungs:  Chest/Lungs Findings: Clear to auscultation     Heart/Vascular:  Heart Findings: Rate: Normal  Rhythm: Regular Rhythm  Sounds: Normal        Mental Status:  Mental Status Findings:  Alert and Oriented         Anesthesia Plan  Type of Anesthesia, risks & benefits discussed:  Anesthesia Type:  general  Patient's Preference: Proceed with anesthesia understanding that the risks are very small but could be serious or life threatening.  Intra-op Monitoring Plan: standard ASA monitors  Intra-op Monitoring Plan Comments:   Post Op Pain Control Plan:   Post Op Pain Control Plan Comments:   Induction:   IV  Beta Blocker:  Patient is not currently on a Beta-Blocker (No further documentation required).       Informed Consent: Patient understands risks and agrees with Anesthesia plan.  Questions answered. Anesthesia consent signed  with patient.  ASA Score: 2     Day of Surgery Review of History & Physical: I have interviewed and examined the patient. I have reviewed the patient's H&P dated:            Ready For Surgery From Anesthesia Perspective.

## 2017-11-08 NOTE — PATIENT INSTRUCTIONS
Discharge Summary/Instructions after an Endoscopic Procedure  Patient Name: Al Baker  Patient MRN: [id number]  Patient YOB: 1975  Wednesday, November 08, 2017  Tricia Moreno MD  RESTRICTIONS:  During your procedure today, you received medications for sedation.  These   medications may affect your judgment, balance and coordination.  Therefore,   for 24 hours, you have the following restrictions:   - DO NOT drive a car, operate machinery, make legal/financial decisions,   sign important papers or drink alcohol.    ACTIVITY:  The following day: return to full activity including work, except no heavy   lifting, straining or running for 3 days if polyps were removed.  DIET:  Eat and drink normally unless instructed otherwise.     TREATMENT FOR COMMON SIDE EFFECTS:  - Mild abdominal pain, belching, bloating or excessive gas: rest, eat   lightly and use a heating pad.  - Sore Throat: treat with throat lozenges and/or gargle with warm salt   water.  SYMPTOMS TO WATCH FOR AND REPORT TO YOUR PHYSICIAN:  1. Abdominal pain or bloating, other than gas cramps.  2. Chest pain.  3. Back pain.  4. Chills or fever occurring within 24 hours after the procedure.  5. Rectal bleeding, which would show as bright red, maroon, or black stools.   (A tablespoon of blood from the rectum is not serious, especially if   hemorrhoids are present.)  6. Vomiting.  7. Weakness or dizziness.  8. Because air was used during the procedure, expelling large amounts of air   from your rectum or belching is normal.  9. If a bowel prep was taken, you may not have a bowel movement for 1-3   days.  This is normal.  GO DIRECTLY TO THE NEAREST EMERGENCY ROOM IF YOU HAVE ANY OF THE FOLLOWING:      Difficulty breathing  Chills and/or fever over 101 F   Persistent vomiting and/or vomiting blood   Severe abdominal pain   Severe chest pain   Black, tarry stools   Bleeding- more than one tablespoon   Any other symptom or condition that you may  feel needs urgent attention  Your doctor recommends these additional instructions:  If any biopsies were taken, your doctor s clinic will contact you in 1 to 2   weeks with any results.  You are being discharged to home.   You have a contact number available for emergencies.  The signs and symptoms   of potential delayed complications were discussed with you.  You may return   to normal activities tomorrow.  Written discharge instructions were   provided to you.   Resume your previous diet.   We are waiting for your pathology results.   Return to your GI clinic as previously scheduled.  For questions, problems or results please call your physician - Tricia Moreno MD at Work:  [phone].  [facility], EMERGENCY ROOM PHONE NUMBER: [phone]  IF A COMPLICATION OR EMERGENCY SITUATION ARISES AND YOU ARE UNABLE TO REACH   YOUR PHYSICIAN - GO DIRECTLY TO THE EMERGENCY ROOM.  Tricia Moreno MD  11/8/2017 11:52:41 AM  This report has been verified and signed electronically.

## 2017-11-08 NOTE — TELEPHONE ENCOUNTER
Per Dr Moreno I scheduled pt for Remicade levels and ab's for 11/10 @ 9:00    Called pt, no answer, LM explaining that I have him scheduled and I left time and date and also reminded him about his follow up on 11/27 @ 2:20  Left my direct line in case he had any questions

## 2017-11-08 NOTE — TRANSFER OF CARE
Anesthesia Transfer of Care Note    Patient: Al Baker    Procedure(s) Performed: Procedure(s) (LRB):  POUCHOSCOPY (N/A)    Patient location: GI    Anesthesia Type: general    Transport from OR: Transported from OR on room air with adequate spontaneous ventilation    Post pain: adequate analgesia    Post assessment: tolerated procedure well and no apparent anesthetic complications    Post vital signs: stable    Level of consciousness: awake, alert and oriented    Nausea/Vomiting: no nausea/vomiting    Complications: none    Transfer of care protocol was followed      Last vitals:   Visit Vitals  BP (!) 101/57   Pulse 87   Temp 36.5 °C (97.7 °F)   Resp 15   Ht 6' (1.829 m)   Wt 83.9 kg (185 lb)   SpO2 97%   BMI 25.09 kg/m²

## 2017-11-10 ENCOUNTER — LAB VISIT (OUTPATIENT)
Dept: LAB | Facility: HOSPITAL | Age: 42
End: 2017-11-10
Attending: INTERNAL MEDICINE
Payer: COMMERCIAL

## 2017-11-10 DIAGNOSIS — K50.014 CROHN'S DISEASE OF SMALL INTESTINE WITH ABSCESS: ICD-10-CM

## 2017-11-10 PROCEDURE — 36415 COLL VENOUS BLD VENIPUNCTURE: CPT

## 2017-11-10 PROCEDURE — 82397 CHEMILUMINESCENT ASSAY: CPT

## 2017-11-15 ENCOUNTER — TELEPHONE (OUTPATIENT)
Dept: ENDOSCOPY | Facility: HOSPITAL | Age: 42
End: 2017-11-15

## 2017-11-15 ENCOUNTER — TELEPHONE (OUTPATIENT)
Dept: GASTROENTEROLOGY | Facility: CLINIC | Age: 42
End: 2017-11-15

## 2017-11-16 ENCOUNTER — TELEPHONE (OUTPATIENT)
Dept: GASTROENTEROLOGY | Facility: CLINIC | Age: 42
End: 2017-11-16

## 2017-11-16 NOTE — TELEPHONE ENCOUNTER
----- Message from Rg Villeda sent at 11/16/2017  2:40 PM CST -----  Contact: Pt    PT missed a call and would the nurse to return their call   Pt can be reached at [phone]

## 2017-11-22 LAB — ANTI-INFLIXIMAB ANTIBODY: NORMAL

## 2017-11-26 NOTE — PROGRESS NOTES
[facility]             Inflammatory Bowel Disease Follow-up  Note            Tricia Moreno MD & GUS Khan  TODAY'S VISIT DATE:  11/26/2017    Chief Complaint:   Chief Complaint   Patient presents with    Crohn's Disease     PCP: Phi Martin    Previous History:  Al Baker is a 42 y.o. male with initial history of Ulcerative Colitis now with Crohn's disease of the J pouch s/p 3 step restorative proctocolectomy with IPAA (2/17/2005, 7/2005, 9/30/2005) due to toxic megacolon with acute perforation.  He has a past medical history of psoriasis of elbows (diagnosed mid 10/2017).  He began with symptoms of bloody diarrhea in 1996 and was diagnosed via colonoscopy in [place] (we do not have these records).  He was on no maintenance medications at that time due to admitted non-compliance and recalls being on prednisone courses a few times a year which helped.  In 2/2005 his symptoms worsened with bloody diarrhea, abdominal pain, and weight loss but was treated conservatively.  48 hours later, he presented to [facility] with severe abdominal pain and was diagnosed with toxic megacolon with acute perforation confirmed by CT scan.  On 2/17/05 patient underwent a total abdominal colectomy with Vera pouch with end ileostomy. Pathology showed chronic ulcerative with focally active state and appendix with inflammatory changes c/w with UC.  He hospital stay was approximately 45 days and complications included ileus and a right paracolic gutter and rectovesical pouch fluid collection confirmed on CT in 3/2005 requiring an ex lap for eval of abscesses and endoloop abscesses.  These were treated with ancef and flagyl post-op.  In 7/2005 he underwent his second stage of the operation creating the J pouch and a proctectomy.  Then on 9/30/2005, he had a ileostomy takedown with SB resection (op report not available, info from clinic notes).  He did well from 8625-1162 except for yearly  "minor flares of "pouchitis" with symptoms of diarrhea, bloating, and abdominal cramping that was treated with a short course of flagyl.  This treatment resolved his symptoms quickly.  Unfortunately in summer of 2015, he developed an allergy to flagyl (rash) that had to be treated with prednisone.  He has taken cipro for his pouchitis symptoms but this has not worked as well as flagyl.  In 2/2015 had a significant amount of stressors due to wife having medical issues and son being premature causing worsening symptoms that was abated with a few course of cipro and prednisone though his symptoms never completely resolved.  In fall 2016, he began with some rectal pain that was thought to be due to hemorrhoids and was recommended to take cream and sitz baths.  By 12/2016 he had continued rectal pain, anal leakage of pus and blood, hip pain, and chils causing a suspicion for a perianal abscess. He had a flex sig the same day as his clinic appt on 12/7/16 which showed significant ulceration and bleeding in the J pouch body involving the anastomosis and neoterminal ileum (per notes 30 cm).  He started remicade 5 mg/kg with induction followed by maintenance in 2/2017 and has completed 6 infusions with his last on 10/13/2017.  He was having symptoms 2 weeks prior to a remicade infusion so his frequency was decreased to every 6 weeks remaining at 5 mg/kg.  He had some swelling to his eye after a remicade infusion that was treated with PO benadryl and resolved.  His fistula closed in 3/2017 but reopened in 4/2017 and he had a recurrent perianal abscess in 5/2017 that was treated with Cipro.  It was incised and drained in the office by Dr. Orantes in 7/2017.   MRI abd/pelvis on 7/29/17 showed right anterolateral perianal fistula terminating in the abscess int he anteromedial right gluteal fold.  He was referred to Dr. Chavez for a surgical opinion in 8/2017 and had an EUA with abscess drainage and placement of 2 setons on " "8/1/2017.  Since the EUA and seton placement, he has had 3 recurrent abscesses that has been treated with 2 courses of Bactrim and current treatment of Augmentin.  With his remicade infusion on 10/13/2017, he had immediate SOB and "hot" feeling after his initial IV infiltrated and was restarted in the opposite arm.  The reaction was treated with IV benadryl and IV steroids and the symptoms resolved.  We planned to restage his disease with a pouchoscopy and order trough IFX levels and ABs to see if we can further optimize remicade or if there is a need for a treatment change.      Interval History:  - current IBD meds: None  - stopped remicade 5 mg/kg every 6 weeks, last infusion 11/16/2017 per our recommendations  - 8-10 watery-loose Bowel Movements/day with 4 nocturnal BMs, with a little blood on toilet paper  - 11/8/2017 Pouchoscopy: Perianal fistula with setons intact and no abscess found on perianal exam; moderately active inflammation of the J pouch and neoterminal ileum consistent with Crohn's disease of the J pouch (path-neoterminal ileum: Moderate to severe active enteritis with fibrinopurulent exudate) (path-pouch body: Mild to moderate chronic active enteritis)  - 11/12/2017: [facility] Trough IFX level < 0.4, ABs [id number]  - completed Augmentin course approximately 1 month ago  - increase in fatigue in the last 3-4 days, decreased appetite   - left testicle pain for 3-4 months: feels like the testicle gets tight/aching pain, pulling pain, having trouble with ejaculation, has some burning after urination and after orgasim  - fever/chills: temp 99.8 with forehead thermometer   - weight loss: 14 lbs since last clinic visit  - Intermittent mid abdominal pain: cramping mid abdominal/umbilical pain 4-10/10, worse prior to a BM and relieved with evacuation  - GERD: occasional, worse with certain foods, relieved with OTC zantac and rolaids   - joint pain/lower/upper back pain: bilateral shoulder, using ice packs with " some relief  - Depression/Anxiety: on PRN Xanax, worse with disease progression  - insomnia: due to nocturnal BMs  - constitutional/GI symptoms: no dysphagia  - extraintestinal manifestations: no eye pain/redness, skin lesions/rashes, liver problems, hematuria    Prior Pertinent Surgeries:   2005: Total abdominal colectomy with Vera pouch and ileostomy (path: chronic ulcerative colitis, focally in an active state; benign reactive lymph nodes; margins clear of significant pathology; appendix with focal inflammatory changes consistent with UC; no dysplasia)  3/2/2005: ex lap eval of abscesses and endoloop abscesses (path-peritoneal rind tissue: multiple fragments of fibron mixed with acute inflammatory cell exudate and fibrofatty connective tissue)  2005: take down closure of loop ileostomy, small bowel resection  2017: EUA with placement of seton Xs 2    Pertinent Endoscopy/Imagin2005 CT abd/pelvis: UC involvement throughout the colon from the cecum to the distal descending colon at the junction of the sigmoid colon; not as much changes seen involving the sigmoid colon and the rectum is seen when compared to the rest of the colon   2005 KUB: gas within transverse colon, descending colon, and sigmoid colon with not as much intestinal gas seen when compared to previous study which could be related to decrease or resolving ileus; transverse and sigmoid demonstrate findings which could be related to UC  2005 KUB: gaseous distention of large and small bowel loops with air fluid levels, more numerous than on the previous exam  3/1/2005 CT abd/pelvis: s/p total colectomy and RLQ enterostomy; right paracolic gutter and rectovesical pouch fluid collection  2016 Flex Sig: continuous area of bleeding ulcerated mucosa with stigmata of recent bleeding present in the pouch; a continuous area of ulcerated mucosa with no stigmata of recent bleeding at the anastomosis and neoterminal ileum to  30 cm; healing perirectal abscess (path-neoterminal ileum: chronic active ileitis with ulceration)  7/29/2017 MRI abd/pelvis: right anterolateral perianal fistula terminating in abscess in the anteromedial right gluteal fold  11/8/2017 Pouchoscopy: Perianal fistula with setons intact and no abscess found on perianal exam; moderately active inflammation of the J pouch and neoterminal ileum consistent with Crohn's disease of the J pouch (path-neoterminal ileum: Moderate to severe active enteritis with fibrinopurulent exudate) (path-pouch body: Mild to moderate chronic active enteritis)    Pertinent Labs:  7/14/2017: BUN 10, Creatinine 1.12, Albumin 3.4, Total bili 0.2, alk phos 65, AST 22, ALT 27, WBC 9.7, RBC 4.47, Hgb 13.9, Hct 41.3, MCV 92.5, platelets 278, CRP 1.95 (<0.80)  11/12/2017: [facility] Trough IFX level < 0.4, ABs 19062  No results found for: STOOLCULTURE, ZMEJHYCRMT4I, XEEVDBFSIV5I, CDIFFICILEAN, CDIFFTOX, CDIFFICILEBY  Lab Results   Component Value Date    CRP 3.3 11/07/2017     Lab Results   Component Value Date    [id number] 33 11/07/2017     Lab Results   Component Value Date    HEPBCAB Negative 11/07/2017     Lab Results   Component Value Date    VARICELLAZOS 4.54 (H) 11/07/2017    VARICELLAINT Positive (A) 11/07/2017     Lab Results   Component Value Date    NIL 0.051 11/07/2017    TBAG 0.044 11/07/2017    TBAGNIL -0.007 11/07/2017    MITOGENNIL >10.000 11/07/2017    TBGOLD Negative 11/07/2017     No results found for: TPMTRESULT  No results found for: ANSADAINIT, INFLIXIMAB, INFLIXINTERP    Prior IBD Meds:  Flagyl (allergy)  Cipro  Prednisone  Remicade 5 mg/kg every 6 weeks    Current IBD Meds:  None    Vaccinations: Patient to bring immunization records to next clinic visit  Flu shot: not up to date  Chickenpox status/Varicella vaccine:  Lab Results   Component Value Date    VARICELLAZOS 4.54 (H) 11/07/2017    VARICELLAINT Positive (A) 11/07/2017   Tetanus: 2017  Pneumonia 13 (PCV13) vaccine: not  sure  Pneumonia 23 (PPSV23) vaccine: not sure   Hepatitis B: had vaccine   Lab Results   Component Value Date    HEPBSAB Negative 11/07/2017   Hepatitis A: hade vaccine  HPV: NA   Meningococcal: NA     NSAID use/indication:  Ibuprofen once a week--600 mg     Narcotic use:  Percocet 10/325 mg prn- none in the last 2 weeks     Alternative/Complementary Meds for IBD:  Probiotics, MVI, vit C, vitamins to boost immune system    Review of Systems   Constitutional: Positive for chills, fever and weight loss.   HENT:        No oral ulcers, dysphagia, oral thrush   Eyes: Negative for blurred vision, pain and redness.   Respiratory: Negative for cough and shortness of breath.    Cardiovascular: Negative for chest pain.   Gastrointestinal: Positive for abdominal pain, heartburn and nausea. Negative for vomiting.   Genitourinary: Negative for dysuria and hematuria.   Musculoskeletal: Positive for back pain and joint pain.   Skin: Negative for rash.   Psychiatric/Behavioral: Positive for depression. The patient is nervous/anxious and has insomnia.      All Medical History/Surgical History/Family History/Social History/Allergies have been reviewed and updated in EMR    Review of patient's allergies indicates:   Allergen Reactions    Flagyl [metronidazole hcl] Hives     Outpatient Prescriptions Marked as Taking for the 11/27/17 encounter (Office Visit) with WINNIE Carmen   Medication Sig Dispense Refill    ALPRAZolam (XANAX) 0.25 MG tablet Take 0.5 mg by mouth 3 (three) times daily.       dextroamphetamine-amphetamine (ADDERALL) 15 mg tablet Take by mouth.      diphenoxylate-atropine 2.5-0.025 mg (LOMOTIL) 2.5-0.025 mg per tablet Take 1 tablet by mouth 2 (two) times daily.  0    loperamide (IMODIUM) 2 mg capsule Take 6 mg by mouth 2 (two) times daily as needed for Diarrhea.       Vital Signs:  Vitals:    11/27/17 1422   BP: 115/73   Pulse: 81   Resp: 16   Temp: 97.8 °F (36.6 °C)   Weight: 77.8 kg (171 lb 8.3 oz)   Height:  6' (1.829 m)   PainSc:   6   PainLoc: Abdomen     Physical Exam   Constitutional: He is oriented to person, place, and time. He appears well-developed.   HENT:   Mouth/Throat: No oral lesions.   Eyes: Conjunctivae and EOM are normal. Pupils are equal, round, and reactive to light.   Cardiovascular: Normal rate and regular rhythm.    Pulmonary/Chest: Effort normal and breath sounds normal.   Abdominal: Soft. Bowel sounds are normal. There is generalized tenderness. There is no rebound and no guarding.   Soft abdomen with mild generalized tenderness; no guarding or rebound; normal bowel sounds   Genitourinary: Left testis shows no mass, no swelling and no tenderness.   Genitourinary Comments: No left testicular tenderness at present, no abscess  Setons intact   Musculoskeletal:        Right lower leg: He exhibits no swelling.        Left lower leg: He exhibits no swelling.   Neurological: He is alert and oriented to person, place, and time.   Skin: No rash noted.   Psychiatric: He has a normal mood and affect.   Nursing note and vitals reviewed.    Labs: Reviewed and pertinent noted above    Assessment/Plan:  Al Baker is a 42 y.o. male with initial history of Ulcerative Colitis now with Crohn's disease of the J pouch s/p 3 step restorative proctocolectomy with IPAA (2/17/2005, 7/2005, 9/30/2005) due to toxic megacolon with acute perforation.  He has a past medical history of psoriasis of elbows (diagnosed mid 10/2017).  We restaged his disease with a pouchoscopy on 11/8/2017 that showed a perianal fistula with setons intact and no abscess, moderately active Crohn's disease of the J pouch.  Trough [facility] levels and ABs were drawn on 11/12/2017 and showed no levels [place] [address].  We stopped remicade with his last dose on 11/16/2017 and will start humira with induction followed by maintenance of 40 mg SC every 14 days and plan to start oral MTX 12.5 weekly for immunogenicity prevention.  We felt  humira was the best option due to abs to remicade and evidence to support use of it in Crohn's disease of the J pouch. He reports feeling worse in the last days with extreme fatigue, decreased appetite, and a 14 lb weight loss since his last clinic visit so we will start entocort 9 mg PO daily for induction while waiting for humira to start working.  If entocort fails to induce him into remission within the next 2 weeks, we will consider a course of prednisone.  He has stable vital signs at present and does not warrant admission though we will follow him closely with frequent clinic visits and consider admission if there is any significant change that would warrant IVF and IV steroids.      # Crohn's disease of the J pouch (Initial history of Ulcerative Colitis now with Crohn's disease of the J pouch s/p 3 step restorative proctocolectomy with IPAA (2/17/2005, 7/2005, 9/30/2005) due to toxic megacolon with acute perforation):    - stop remicade, last dose 11/16/2017  - start entocort 9 mg PO daily for induction, if no improvement in 1-2 weeks will consider a course of prednisone  - discussed MOA, route, dose, risk, benefits in detail of humira and oral MTX in detail during clinic visit today  - sign up for humira complete--RX sent to [facility] speciality pharmacy   - start oral MTX 12.5 mg PO weekly at bedtime today  - start folic acid 1 mg PO daily  - start Humira 160 mg week 0, 80 mg week 2, 40 mg SC every other week- graves tart approval process and once better will have Dr. Martin take over this prescription  - stop smoking given smoking can worsen Crohn's and make him more resistant to Crohn's related treatments--will come up with a plan for next clinic visit  - drug monitoring labs: CBC/CMP every 2 weeks for a month, then in a month, then every 3 months for MTX monitoring, TB Quantiferon negative 11/2017, TPMT normal 11/2017, Hepatitis testing negative 11/2017    # Left testicular pain  - no abscess  - no palpable  tenderness on exam  - urgent urology referral placed    # Malnutrition/Weight loss  - 14 lb weight loss since last clinic visit  - High calorie, low residue diet  - Dietician referral placed  - ensure/boost recommended--3 cans/day--do not replace meals    # Diarrhea stool of varying consistency:   - infectious causes unlikely due to varying consistency and stable symptoms so cultures and c diff--not ordered  - TTG IgA and total serum IgA negative 11/2017  - TSH  Normal 11/2017  - CMV negative 11/2017    # Perianal abscess  - no active abscess at present  - 2 setons in place  - Augmentin completed 1 month ago  - call immediately if abscess returns, will consider MRI pelvis for evaluation  - use oxycodone sparingly--discussed the increase of mortality in Crohn's patients with narcotic use    # Psoriasis possibly remicade induced but mild and being treated  - 2 small patches on bilateral elbows  - continue topical treatment per derm  - continue derm f/u    # Smoker:   - recommended to stop and come up with a plan prior to next visit  - discussed in detail that Crohn's disease can worsen with smoking and may make patient more resistant to treatment    # IBD specific health maintenance- long term immunosuppression :  Colorectal cancer risk:    Risks factors: none-average risk  - colonoscopy:  periodic surveillance of rectal cuff    Opthamologic exam recommended yearly    Dermatologic exam recommended yearly     Bone health:  Calcium 1200 mg daily and vitamin D3 1000 IU daily  Risk of osteopenia/osteoporosis:  Risks factors: steroid use > 3 months  Vitamin D:   Lab Results   Component Value Date    [id number] 33 11/07/2017   - Dexa scan to be ordered in the future    Vaccine counseling:   - No LIVE VACCINES--reminded in clinic today  - ID referral placed    Follow up: with DARIEL Mcghee in 3 weeks with Dr. Moreno in clinic    Total visit time was 45 minutes, more than 50% of which was spent in face-to-face counseling with  patient regarding evaluation, management goals, and treatment options for   Crohn's disease    Litzy Irving, WINNIE-C  Department of Gastroenterology  Inflammatory Bowel Disease Program    I have personally performed a face to face diagnostic evaluation on this patient. I have reviewed and agree with today's findings and the care plan outlined by DARIEL Khan MD   Department of Gastroenterology  Medical Director, Inflammatory Bowel Disease

## 2017-11-27 ENCOUNTER — TELEPHONE (OUTPATIENT)
Dept: GASTROENTEROLOGY | Facility: CLINIC | Age: 42
End: 2017-11-27

## 2017-11-27 ENCOUNTER — OFFICE VISIT (OUTPATIENT)
Dept: GASTROENTEROLOGY | Facility: CLINIC | Age: 42
End: 2017-11-27
Payer: COMMERCIAL

## 2017-11-27 VITALS
HEART RATE: 81 BPM | WEIGHT: 171.5 LBS | BODY MASS INDEX: 23.23 KG/M2 | TEMPERATURE: 98 F | RESPIRATION RATE: 16 BRPM | DIASTOLIC BLOOD PRESSURE: 73 MMHG | SYSTOLIC BLOOD PRESSURE: 115 MMHG | HEIGHT: 72 IN

## 2017-11-27 DIAGNOSIS — N50.819 TESTICULAR PAIN: ICD-10-CM

## 2017-11-27 DIAGNOSIS — L98.8 FISTULA: ICD-10-CM

## 2017-11-27 DIAGNOSIS — R19.7 DIARRHEA, UNSPECIFIED TYPE: ICD-10-CM

## 2017-11-27 DIAGNOSIS — Z79.60 LONG-TERM USE OF IMMUNOSUPPRESSANT MEDICATION: ICD-10-CM

## 2017-11-27 DIAGNOSIS — E46 MALNUTRITION, UNSPECIFIED TYPE: ICD-10-CM

## 2017-11-27 DIAGNOSIS — L40.9 PSORIASIS: ICD-10-CM

## 2017-11-27 DIAGNOSIS — F17.200 SMOKER: ICD-10-CM

## 2017-11-27 DIAGNOSIS — K50.014 CROHN'S DISEASE OF SMALL INTESTINE WITH ABSCESS: Primary | ICD-10-CM

## 2017-11-27 DIAGNOSIS — R63.4 WEIGHT LOSS: ICD-10-CM

## 2017-11-27 PROCEDURE — 99215 OFFICE O/P EST HI 40 MIN: CPT | Mod: S$GLB,,, | Performed by: NURSE PRACTITIONER

## 2017-11-27 PROCEDURE — 99999 PR PBB SHADOW E&M-EST. PATIENT-LVL V: CPT | Mod: PBBFAC,,, | Performed by: NURSE PRACTITIONER

## 2017-11-27 RX ORDER — TRAMADOL HYDROCHLORIDE 50 MG/1
50 TABLET ORAL EVERY 6 HOURS PRN
Qty: 60 TABLET | Refills: 0 | Status: SHIPPED | OUTPATIENT
Start: 2017-11-27

## 2017-11-27 RX ORDER — BUDESONIDE 3 MG/1
9 CAPSULE, COATED PELLETS ORAL DAILY
Qty: 90 CAPSULE | Refills: 2 | Status: SHIPPED | OUTPATIENT
Start: 2017-11-27 | End: 2017-12-27

## 2017-11-27 RX ORDER — METHOTREXATE 2.5 MG/1
12.5 TABLET ORAL
Qty: 20 TABLET | Refills: 2 | Status: SHIPPED | OUTPATIENT
Start: 2017-11-27 | End: 2017-12-27

## 2017-11-27 RX ORDER — DEXTROAMPHETAMINE SACCHARATE, AMPHETAMINE ASPARTATE, DEXTROAMPHETAMINE SULFATE AND AMPHETAMINE SULFATE 3.75; 3.75; 3.75; 3.75 MG/1; MG/1; MG/1; MG/1
TABLET ORAL
COMMUNITY

## 2017-11-27 RX ORDER — FOLIC ACID 1 MG/1
1 TABLET ORAL DAILY
Qty: 30 TABLET | Refills: 11 | Status: SHIPPED | OUTPATIENT
Start: 2017-11-27 | End: 2018-11-27

## 2017-11-27 NOTE — TELEPHONE ENCOUNTER
Called and spoke with Junaid at [facility]  Called in pts Tramadol with No Refills     Pt notified it was called in

## 2017-11-27 NOTE — TELEPHONE ENCOUNTER
----- Message from Tricia Moreno MD sent at 11/27/2017  4:43 PM CST -----  Tramadol prescription written- please call in to his pharmacy

## 2017-11-27 NOTE — LETTER
November 28, 2017        Phi Martin MD  [address]  [place] [address]             [facility]enter[facility]  [address] [address]  Phone: [phone]  Fax: [phone]   Patient: Al Baker   MR Number: [id number]   YOB: 1975   Date of Visit: 11/27/2017       Dear Dr. Martin:    Thank you for referring Al Baker to me for evaluation. Attached you will find relevant portions of my assessment and plan of care.    If you have questions, please do not hesitate to call me. I look forward to following Al Baker along with you.    Sincerely,      WINNIE Carmen            CC  No Recipients    Enclosure

## 2017-11-27 NOTE — PATIENT INSTRUCTIONS
Instructions:  - start entocort 9 mg PO daily  - start oral Methotrexate 12.5 mg PO weekly at bedtime--CBC/CMP in 2 weeks (2017)  - start folic acid 1 mg PO daily   - sign up for Humira-RX sent to [facility] speciality pharmacy  - dietician referral--low fiber, high calories  - ID referral--MA to schedule  - Testicular pain, urgent urology referral--MA to schedule  - stop smoking  - Avoid all NSAIDs (Advil, Ibuprofen, Motrin, Aspirin, Naprosyn, Aleve)  - Yearly Eye exam  - Yearly Skin exam--wear sun block and hats  - Use antibiotics with caution  - Make appointment with Dr. Martin in next 2-4 weeks  - Follow up with DARIEL Mcghee in 3 weeks with Dr. Moreno in clinic    Adalimumab (Humira): Biologics - Anti-TNF Agent  - Mechanism of action:  humira blocks TNF alpha which plays a role in the inflammatory process for inflammatory bowel disease  - Labs     - Repeat labs will be drawn every 6 months to monitor for side effects    Humira Dosage:  - Loading Dose: 160 mg Subcutaneous on week 0 (4 pens or pre-filled syringes), 80 mg SC on week 2 (2 pens or prefilled syringes)  - Maintenance Dosin mg SC every other week (1 pen or prefilled syringe)    Risks of Humira:  Stop therapy due to adverse event= 10% (10/100)    Please call us immediately if you develop any of the below problems    Allergic reactions  - <2% (2/100) develop injection site reactions  - RARE- hives (rash), difficulty breathing, chest pain/tightness, high or low blood pressure, swelling of the face and hands, fever or chills    Serious Infections= 3% (3/100)  fever, tiredness, flu, open sores, warm red painful skin    Blood Disorders  fever that doesn't go away, bruising, bleeding, severe paleness    Non-Hodgkins Lymphoma=0.06% (6/10,000)    Drug related lupus-like reaction= 1% (1/100)  chest discomfort or pain that does not go away, shortness of breath, joint pain, rash on the cheeks or arms that gets worse in the sun    Psoriasis  new or worsening red  scaly patches or raised bumps on the skin that are filled with pus     Case Reports Only: Multiple Sclerosis and Guillain Beaumont Syndrome  Numbness/weakness/tingling of your hands and feet, changes in your vision or seizures    Case Reports Only: New or worsening Congestive Heart Failure  shortness of breath, swelling in your ankles or feet, sudden weight gain    Case Reports Only: Serious Liver Injury  jaundice (yellow skin or eyes), dark brown urine, right-sided abdominal pain, fever, severe itchiness    Vaccine Counseling  Sending to ID    - All Immunizations ideally should be up to date prior to starting therapy--NO LIVE vaccines during therapy or 8 weeks prior to therapy  - Live Vaccines Include:   --Intranasal Influenza A/B  --Measles, Mumps, Rubella (MMR)  --Rotavirus  --Typhoid (oral)  --Vaccina (Smallpox)  --Varicella (Chicken Pox)  --Yellow Fever  --Zoster    Methotrexate:  - we are starting this to prevent antibodies from forming to the Humira with highest risk being in first 6-12 months of taking Humira  - take methotrexate 12.5 mg orally once weekly at bedtime to minimize any side effects  - methotrexate can decrease your folic acid--take folic acid 1 mg daily  - need to order Fibroscan of 2 gm of MTX--approximately 2 years after start    Common SE:   ?Gastrointestinal problems, such as nausea, stomach upset, and loose stools  ?Stomatitis or soreness of the mouth  ?Abnormal liver chemistries   ?Kidney problems  ?Rash- photosensivity- wear sunblock  ?Headache, fatigue, or impaired ability to concentrate  ?Hair loss- rare  ?Fever, which is drug-related  ?Hematologic abnormalities- abnormal blood counts

## 2017-11-28 ENCOUNTER — TELEPHONE (OUTPATIENT)
Dept: PHARMACY | Facility: CLINIC | Age: 42
End: 2017-11-28

## 2017-11-28 ENCOUNTER — TELEPHONE (OUTPATIENT)
Dept: GASTROENTEROLOGY | Facility: CLINIC | Age: 42
End: 2017-11-28

## 2017-11-28 PROBLEM — R63.4 WEIGHT LOSS: Status: ACTIVE | Noted: 2017-11-28

## 2017-11-28 PROBLEM — E46 MALNUTRITION: Status: ACTIVE | Noted: 2017-11-28

## 2017-11-28 PROBLEM — N50.819 TESTICULAR PAIN: Status: ACTIVE | Noted: 2017-11-28

## 2017-11-28 NOTE — TELEPHONE ENCOUNTER
----- Message from Sariah Marcelo sent at 11/28/2017 10:39 AM CST -----  Contact: Self- [phone]  Josh- pt called to speak with Patricia- says the pharmacy didn't have the steroid that was prescribed- pt took Prednisone to help with pain last night- wanted to know if he should continue- please call pt back at [phone]

## 2017-11-28 NOTE — TELEPHONE ENCOUNTER
Called pharmacy and spoke with Junaid  She stated pts script is ready for pickup  Called and spoke with pt and let him know  He stated last night the pain was so bad he started taking Prednisone 10 mgs.  He stated he had chills and sweats (did not check temp) he almost vomited the pain was that bad.    He only has 5 tablets left of Prednisone and wants to know if he should continue the Prednisone and if he should start the Entocort   I told him I would send message over to Dr Moreno and once I get a response I will be in touch

## 2017-11-28 NOTE — TELEPHONE ENCOUNTER
----- Message from Oriana López MA sent at 11/28/2017  3:26 PM CST -----  Contact: [phone] Krystal Moreno  Pt asking if you can call his wife to discuss the amount of pain he is having. NO other detail given    [phone] Krystal

## 2017-11-28 NOTE — TELEPHONE ENCOUNTER
Informed patient [facility] received a prescription for Humira and it will require a prior authorization with their insurance company. We will update patient of status as more information is received.

## 2017-11-28 NOTE — TELEPHONE ENCOUNTER
Called and spoke with Krystal pts wife   She was getting an update to see if Dr Moreno had responded about pts meds.  I explained not yet but I have sent it over and we are in clinic so it will be closer to 5.  She stated he is in severe pain and I asked the pain scale and she stated 10/10.  I told her Dr Moreno is going to recommend the ER for anything more than 7/10.   I told her I would send over another message and would be in touch.  Pt has no phone at the moment as he dropped it in the tub. So call wife      Spoke with Dr Moreno  I called Krystal and spoke with her  I explained he is to stop Prednisone and start Entocort as directed and take the Tramadol for the pain  If his pain stays at a 7/10 or higher he needs to go to the closest ER.  If he goes to [facility] I told her Dr Moreno will be happy to consult with Dr Martin   She expressed understanding

## 2017-11-29 ENCOUNTER — PATIENT MESSAGE (OUTPATIENT)
Dept: GASTROENTEROLOGY | Facility: CLINIC | Age: 42
End: 2017-11-29

## 2017-11-29 ENCOUNTER — TELEPHONE (OUTPATIENT)
Dept: GASTROENTEROLOGY | Facility: CLINIC | Age: 42
End: 2017-11-29

## 2017-11-29 DIAGNOSIS — R11.0 NAUSEA: Primary | ICD-10-CM

## 2017-11-29 RX ORDER — ONDANSETRON 4 MG/1
4 TABLET, ORALLY DISINTEGRATING ORAL EVERY 8 HOURS PRN
Qty: 60 TABLET | Refills: 0 | Status: SHIPPED | OUTPATIENT
Start: 2017-11-29

## 2017-11-29 NOTE — TELEPHONE ENCOUNTER
RX zofran for nausea sent in to patient's pharmacy.  Advised to go to the ER for abd pain 7/10 or greater.      KG

## 2017-11-29 NOTE — TELEPHONE ENCOUNTER
Patient states that he is in constant abdominal pain, at the moment his pain is 4/10. Patient states that when he feels the need to have a bowel movement, he gets chills and becomes nauseated.     I strongly advised pt to go to the ER for pain 7/10 or greater. I briefly discussed with pt the risks of severe abdominal pain, such as abscess or blockage. Pt verbalized understanding, stated that he is considering going to the ER is the abdominal pain continues/increases.    Pt is requesting an antiemetic. Please advise.

## 2017-11-29 NOTE — TELEPHONE ENCOUNTER
----- Message from Chante Cha sent at 11/29/2017  4:18 PM CST -----  Contact: Pt #[phone]  Pt states he is having some issues and would like to speak to the nurse #[phone]

## 2017-11-30 ENCOUNTER — TELEPHONE (OUTPATIENT)
Dept: GASTROENTEROLOGY | Facility: CLINIC | Age: 42
End: 2017-11-30

## 2017-11-30 DIAGNOSIS — K50.014 CROHN'S DISEASE OF SMALL INTESTINE WITH ABSCESS: Primary | ICD-10-CM

## 2017-11-30 NOTE — TELEPHONE ENCOUNTER
Pt's wife expressed concern that pt is currently in so much pain that he can no longer tolerate nutrition PO. Pt's wife reported that she was aware that I would advise the pt to go to the ER, since we have been advising to this for the past few days. I confirmed that pt should go to the nearest ER for his severe pain.    Pt's wife stated that's she did not believe he would be able to tolerate a 2 hour ER wait time. I strongly advised that pt should go to the nearest ER and suggested that an ambulance be utilized if needed.    I informed   that I would discuss this with Dr. GIFTY Moreno and call her back afterwards.  Pt's wife verbalized understanding.

## 2017-11-30 NOTE — TELEPHONE ENCOUNTER
----- Message from Deloris Brennan MA sent at 11/30/2017  1:20 PM CST -----  Contact: Mrs. Sharma  spouse  [phone]  States she needs to speak to you regarding his crohn's disease and states he is suffering due to not being able to get the medication.

## 2017-11-30 NOTE — TELEPHONE ENCOUNTER
FOR DOCUMENTATION ONLY:  Humira prior authorization approved   10/29/17 through 2/26/18  Case ID: [id number] & [id number]  Estimated $300 copay    Humira copay savings card:  RxBIN: [id number]  RxPCN: [id number]  RxGRP: [id number]  RxID: [id number]  Suf: 01  For more information, please visit  [email or web address] or call [phone]  $5.00 copay    Patient must use [facility]  Phone: [phone]  Fax: [phone]

## 2017-11-30 NOTE — TELEPHONE ENCOUNTER
Pt's wife states she is taking pt to [facility] ER. Informed pt's wife that Dr. Moreno has spoken to Dr. Martin regarding this matter; appropriate care will be coordinated.

## 2017-12-01 NOTE — TELEPHONE ENCOUNTER
Spoke to pt's wife, informed her that Humira is approved and that she should expect a call to arrange shipment. Pt's wife verbalized understanding and also stated that the Nurse Ambassador called her yesterday evening.     I requested that Mrs. Baker give me an update on pt:    Pt admitted to [facility] on 11/30/2017. As per GI Md in hospital: 1- pt has active disease again, 2- current steriods are not working, will increase dose.    Pt is recieving IV Dilaudid for pain, which is not meeting pain goal.    Pt is receiving IV Ativan for anxiety.    Pt is not on any Abx to wife's knowledge.

## 2017-12-04 ENCOUNTER — TELEPHONE (OUTPATIENT)
Dept: GASTROENTEROLOGY | Facility: CLINIC | Age: 42
End: 2017-12-04

## 2017-12-04 NOTE — TELEPHONE ENCOUNTER
Spoke to pt who stated that the phone message was incorrect: He does not have the ability to take time off this week; pt states that if he did take this week off it would have to be with STD until the end of 2017.    I informed pt of the context of the message received, and that I was calling to advise him to take the time off, if it was available (due to the severity of his abdominal pain that resulted in hospitalization).    Pt states the reason for his call is to get a note saying he may return to work. I advised pt to contact Dr. Martin regarding this matter, as he is the pt's PCP.

## 2017-12-04 NOTE — TELEPHONE ENCOUNTER
----- Message from Oriana López MA sent at 12/4/2017 11:56 AM CST -----  Contact: [phone]  BAYLEE Moreno  Pt is on medical disability leave till  12/11 but feeling better and asking if he can go back to work tomorrow.  [phone]

## 2017-12-05 NOTE — TELEPHONE ENCOUNTER
Attempted to contact pt to discuss the aforementioned conversation. [phone] call back number given. Awaiting call back.

## 2017-12-06 ENCOUNTER — CLINICAL SUPPORT (OUTPATIENT)
Dept: INFECTIOUS DISEASES | Facility: CLINIC | Age: 42
End: 2017-12-06
Payer: COMMERCIAL

## 2017-12-06 ENCOUNTER — OFFICE VISIT (OUTPATIENT)
Dept: INFECTIOUS DISEASES | Facility: CLINIC | Age: 42
End: 2017-12-06
Payer: COMMERCIAL

## 2017-12-06 VITALS
HEART RATE: 96 BPM | SYSTOLIC BLOOD PRESSURE: 141 MMHG | HEIGHT: 72 IN | BODY MASS INDEX: 23.32 KG/M2 | DIASTOLIC BLOOD PRESSURE: 71 MMHG | TEMPERATURE: 98 F | WEIGHT: 172.19 LBS

## 2017-12-06 DIAGNOSIS — Z79.60 LONG-TERM USE OF IMMUNOSUPPRESSANT MEDICATION: Primary | ICD-10-CM

## 2017-12-06 DIAGNOSIS — Z79.60 LONG-TERM USE OF IMMUNOSUPPRESSANT MEDICATION: ICD-10-CM

## 2017-12-06 PROCEDURE — 90472 IMMUNIZATION ADMIN EACH ADD: CPT | Mod: S$GLB,,, | Performed by: INTERNAL MEDICINE

## 2017-12-06 PROCEDURE — 99999 PR PBB SHADOW E&M-EST. PATIENT-LVL II: CPT | Mod: PBBFAC,,,

## 2017-12-06 PROCEDURE — 99203 OFFICE O/P NEW LOW 30 MIN: CPT | Mod: S$GLB,,, | Performed by: PHYSICIAN ASSISTANT

## 2017-12-06 PROCEDURE — 99999 PR PBB SHADOW E&M-EST. PATIENT-LVL III: CPT | Mod: PBBFAC,,, | Performed by: PHYSICIAN ASSISTANT

## 2017-12-06 PROCEDURE — 90471 IMMUNIZATION ADMIN: CPT | Mod: S$GLB,,, | Performed by: INTERNAL MEDICINE

## 2017-12-06 PROCEDURE — 90740 HEPB VACC 3 DOSE IMMUNSUP IM: CPT | Mod: S$GLB,,, | Performed by: INTERNAL MEDICINE

## 2017-12-06 PROCEDURE — 90632 HEPA VACCINE ADULT IM: CPT | Mod: S$GLB,,, | Performed by: INTERNAL MEDICINE

## 2017-12-06 RX ORDER — PREDNISONE 10 MG/1
30 TABLET ORAL
COMMUNITY
Start: 2017-11-20 | End: 2017-12-21 | Stop reason: SDUPTHER

## 2017-12-06 NOTE — TELEPHONE ENCOUNTER
Please call patient and get clarification of what meds he is on.  He should be on entocort 9 mg daily or prednisone (if prednisone was given during his hospitalization).  Also he should be starting Humira- ask him if he has received shipment and whether he has taken his first dose yet? Please remind him of his appt with us on 12/21.

## 2017-12-06 NOTE — PROGRESS NOTES
Pre Biologic Response Modifier Therapy Consult  BMR Recipient Evaluation    Requesting Physician: Dr. Moreno    Reason for Visit:    Chief Complaint   Patient presents with    Immunizations     History of Present Illness  Al Baker is a 42 y.o. year old White male with advanced Crohns Disease currently being evaluated for prior to starting Humira. Pt started on Humira 12.2.17. Pt is currently on amoxicillin PO BID for perianal abscess for 14 days. Pt is on prednisone 40 mg daily, MTX 2.5 once a week, Entocort 9mg daily. Pt was recently hospitalized November 30- Dec 3rd.    Patient denies any recent fever, chills, or infective infective illnesses.       1) Do you have a history of:    YES NO   Diabetes                 []       [x]   Diabetic Foot Infection/Bone Infection  []  [x]   Surgical Removal of Spleen    []  [x]       2) Have you had recurrent infections involving:             YES NO  Sinus infections  []  [x]  Sore Throat   []  [x]                             Prostate Infections  []  [x]  .                         Bladder Infections  [x]  []                                 Kidney Infections  []  [x]        Intestinal Infections  []  [x] pt has psoriasis.   Skin Infections   []  [x]                   Reproductive Infections               Periodontal Disease                   3)Have you ever had: YES     NO UNKNOWN      Chicken Pox   [x]  []  []                 Shingles   []  [x]  []                Orolabial Herpes             []  [x]  []       Genital Herpes  []  [x]  []           Cytomegalovirus  []  [x]  []               Rosetta-Barr Virus  []  [x]  []              Genital Warts   []  [x]  []                Hepatitis A   []  [x]  []             Hepatitis B   []  [x]  []                Hepatitis C   []  [x]  []                 Syphilis   []  [x]  []                Gonorrhea   []  [x]  []                 Pelvic Inflammatory  []  [x]  []   Disease   []  [x]  []                    Chlamydia  Infection  []  [x]  []                Intestinal parasites        or worms   []  [x]  []                 Fungal Infections  []  [x]  []                Blood Infections  []  [x]  []     Comment:    had pneumonia several years ago, treated outpatient. No recurrence.     4) Have you ever been exposed   YES NO  to someone with tuberculosis?  []  [x]   If yes, what treatment did you receive:     5) What states have you lived in? [place]    6) What countries have you visited for more than 2 weeks?  [place], [place], [place], [place], [place], [place]                     YES NO  7) Did you have any associated infections?     []  [x]     8) Are you planning to travel outside the           [place] after starting BRMs?    [x]   []           Household                  YES NO  9 Do you have pets living in your house?   []   [x]             If yes, describe:     Do you spend time or live on a farm or                 have livestock or other farm animals?   []  [x]   If yes, which ones:    Do you have a fish tank?     []  [x]                       Do you have a litter box?     []  [x]                        Do you fish or hunt?      [x]  []                       Do you clean or skin fish or animals?   [x]  []       Do you consume raw or undercooked                meat, fish, or shellfish?     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    [x]  []   13)Do you hike, camp, or spend   time in wooded areas?   [x]  []                       14) The patient's immunization history was reviewed.   Have you ever received:  YES NO UNKNOWN DATES  Routine Childhood vaccines  [x]  []  []                Influenza vaccine   [x]  []  []         Pneumonia    [x]  []  []      Tetanus-diptheria   [x]  []  []   Hepatitis A vaccine series       []  [x]  []       Hepatitis B vaccine series        []  [x]  []        Meningitis vaccine   []  [x]  []     Varicella vaccine   []  [x]  []                  Based on the patients immunization history and serologies, immunizations were ordered:         Ordered  Not Ordered  Influenza Vaccine     []    [x]   Hepatitis A series at 0, 6 months   [x]    [x]   Hepatitis B series at 0, 1, and 6 months  []    [x]   Hepatitis B High Dose series 0,1, and 6 months [x]    []    Prevnar      []    [x]   Pneumovax      []    [x]   TDap       []    [x]   Zoster       []    [x]   Menactra      []    [x]   HiB       []    [x]               The patient was encouraged to contact us about any problems that may develop after immunization and possible side effects were reviewed.        Allergies: Flagyl [metronidazole hcl]  There is no immunization history for the selected administration types on file for this patient.  Past Medical History:   Diagnosis Date    Anxiety     previously treated    Asthma     intermittent no meds    Crohn disease     treated by Dr Smith at , on Remicade IV q6 weeks    Crohn's disease 8/16    Food intolerance 2000    GERD (gastroesophageal reflux disease) 1993    Inflammatory bowel disease 1995    Psoriasis     Ulcerative colitis 5/95     Past Surgical History:   Procedure Laterality Date    ABSCESS DRAINAGE  8/17    COLON SURGERY      COLON SURGERY  8/05    COLONOSCOPY  7/16    COLOSTOMY  6/05    ILEOSTOMY  12/05    REVISION COLOSTOMY  4/06      Social History     Social History    Marital status:      Spouse name: N/A    Number of children: N/A    Years of education: N/A     Occupational History    Not on file.     Social History Main Topics    Smoking status: Light Tobacco Smoker     Packs/day: 0.15     Years: 25.00     Types: Cigarettes    Smokeless tobacco: Never Used    Alcohol use Yes     4 Glasses of wine, 6 Cans of beer, 4 Shots of liquor, 1 Standard drinks or equivalent per week      Comment: socially 12 drinks  a week    Drug use:      Frequency: 7.0 times per week     Types: Amphetamines, Marijuana      Comment: Adderall, Marijuana PO    Sexual activity: Yes     Partners: Female      Comment: Wife takes pills once every 3 months     Other Topics Concern    Not on file     Social History Narrative    Works in sales    On [employer]BCSro from [employer] for Crohn's        Review of Systems   Constitution: Negative for chills, decreased appetite, fever, weakness, malaise/fatigue, night sweats, weight gain and weight loss.   HENT: Negative for congestion, ear pain, hearing loss, hoarse voice, sore throat and tinnitus.    Eyes: Negative for blurred vision, pain, vision loss in left eye, vision loss in right eye and visual disturbance.   Cardiovascular: Negative for chest pain, dyspnea on exertion, leg swelling and palpitations.   Respiratory: Negative for cough, shortness of breath, sputum production and wheezing.    Skin: Negative for dry skin, itching, rash and suspicious lesions.   Musculoskeletal: Negative for back pain, joint pain, myalgias and neck pain.   Gastrointestinal: Negative for abdominal pain, constipation, diarrhea, heartburn, nausea and vomiting.   Genitourinary: Negative for dysuria, flank pain, frequency, hematuria, hesitancy and urgency.   Neurological: Negative for dizziness, headaches, numbness and paresthesias.   Psychiatric/Behavioral: Negative for depression and memory loss. The patient does not have insomnia and is not nervous/anxious.      Physical Exam   Constitutional: He is oriented to person, place, and time. He appears well-developed and well-nourished. No distress.   HENT:   Head: Normocephalic and atraumatic.   Mouth/Throat: Oropharynx is clear and moist.   Eyes: EOM are normal. Pupils are equal, round, and reactive to light.   Neck: Normal range of motion. Neck supple.   Cardiovascular: Normal rate, regular rhythm, normal heart sounds and intact distal pulses.  Exam reveals no gallop and no friction rub.     No murmur heard.  Pulmonary/Chest: Effort normal and breath sounds normal. No respiratory distress. He has no wheezes. He has no rales. He exhibits no tenderness.   Abdominal: Bowel sounds are normal. He exhibits no distension and no mass. There is no tenderness. There is no guarding.   Musculoskeletal: Normal range of motion. He exhibits no edema or deformity.   Neurological: He is alert and oriented to person, place, and time.   Skin: Skin is warm and dry. No rash noted. He is not diaphoretic. No erythema.   Psychiatric: He has a normal mood and affect. His behavior is normal. Judgment and thought content normal.   Nursing note and vitals reviewed.    Diagnostics: No results found for: RPR  No results found for: CMVANTIBODIE  No results found for: HIV1X2  No results found for: HTLVIIIANTIB  Hepatitis B Surface Ag   Date Value Ref Range Status   11/07/2017 Negative  Final     Hep B Core Total Ab   Date Value Ref Range Status   11/07/2017 Negative  Final     Hepatitis C Ab   Date Value Ref Range Status   11/07/2017 Negative  Final     No results found for: TOXOIGG  No components found for: TOXOIGGINTER  No results found for: HJX6WPP  No results found for: XWQ6UVF  Varicella IgG   Date Value Ref Range Status   11/07/2017 4.54 (H) 0.00 - 0.90 ISR Final     Varicella Interpretation   Date Value Ref Range Status   11/07/2017 Positive (A) Negative Final     Comment:     <or=0.90     Negative        No detectable IgG antibody to Varicella zoster  by the NEVA test. Such individuals are presumed to be   uninfected with Varicella zoster and to be susceptible to   primary infection.  0.91-1.09    Equivocal  >or=1.10     Positive        Indicates presence of detectable IgG antibody to Varicella   zoster by the NEVA test. Indicative of previous or current   infection.       No results found for: STRONGANTIGG  No results found for: EPSTEINBARRV  Hep B S Ab   Date Value Ref Range Status   11/07/2017 Negative  Final     No  results found for: QUANTIFERON  No results found for: HEPAIGM  No results found for: PPD  No results found for this or any previous visit.      BRM - Candidacy    Biologic Response Modifier Candidacy: Based on available information, there are no identified significant barriers to BRMs from an infectious disease standpoint pending acceptable serologies.  Final determination of BRM candidacy will be made once evaluation is complete and reviewed.    Will check Strongyloides, RPR today. If positive will need to see pt in ID clinic.     Vaccines today:  Hepatitis A  Hepatitis B high dose  Pneumonia vaccines? Unsure which one he's had. Will follow up.   Will need tdap 10/2018      Garrett Ramirez PA-C        Counseling:I discussed with Al the risk for increased susceptibility to infections following BRM therapy including increased risk for infection.  The patients has been counseled on the importance of vaccinations including but not limited to a yearly flu vaccine.Specific guidance has been provided to the patient regarding the patients occupation, hobbies and activities to avoid future infectious complications including but not limited to avoiding undercooked meats and seafood, proper hygiene, and contact with animals.

## 2017-12-06 NOTE — TELEPHONE ENCOUNTER
Please ask patient to stop the entocort while on prednisone and we will see if we need it.     We will see him in a few weeks as scheduled

## 2017-12-06 NOTE — LETTER
December 6, 2017      Tricia Moreno MD  [address]  [facility] [address]           [facility]  [address]  [facility] [address]  Phone: [phone]  Fax: [phone]          Patient: Al Baker   MR Number: [id number]   YOB: 1975   Date of Visit: 12/6/2017       Dear Dr. Tricia Moreno:    Thank you for referring Al Baker to me for evaluation. Attached you will find relevant portions of my assessment and plan of care.    If you have questions, please do not hesitate to call me. I look forward to following Al Baker along with you.    Sincerely,    Garrett Ramirez PA-C    Enclosure  CC:  No Recipients    If you would like to receive this communication electronically, please contact [email or web address] or [phone] to request more information on [facility] access.    For providers and/or their staff who would like to refer a patient to [facility], please contact us through our one-stop-shop provider referral line, [facility], at [phone].    If you feel you have received this communication in error or would no longer like to receive these types of communications, please e-mail [email or web address]

## 2017-12-06 NOTE — TELEPHONE ENCOUNTER
Called and spoke with pt  I asked what exactly he was needing for work and he stated he got it taken care of already. He stated Dr Julian Muro (Dr Martin's partner)  took care of it  He stated he is feeling much better.    He mentioned he was unsure about continuing with Dr Martin. He asked if a general gi was needed and I explained yes since we are a consult we need someone to release him back to and to coordinate care with.   He stated he will likely continue to see Dr Muro and is planning on having a conversation with Dr Martin about this.

## 2017-12-06 NOTE — TELEPHONE ENCOUNTER
Called and spoke with pt  He was put on Prednisone 40 mgs for a wk and to taper 10 mgs every 7 days. Also on Amoxicillin BID for 14 days. He was told to continue Entocort     He started his Humira on 12/02/2017.  Next dose 12/16/2017  MTX takes on Tuesdays and Folic Acid daily   Nurse Kenjiassador is coming pout to his house tomorrow

## 2017-12-07 NOTE — TELEPHONE ENCOUNTER
Called and spoke with pt  I told him to stop Entocort while on Prednisone and at his visit on 12/21 they will discuss weather or not to restart it.  He expressed understanding

## 2017-12-21 ENCOUNTER — OFFICE VISIT (OUTPATIENT)
Dept: GASTROENTEROLOGY | Facility: CLINIC | Age: 42
End: 2017-12-21
Payer: COMMERCIAL

## 2017-12-21 ENCOUNTER — LAB VISIT (OUTPATIENT)
Dept: LAB | Facility: HOSPITAL | Age: 42
End: 2017-12-21
Payer: COMMERCIAL

## 2017-12-21 VITALS
HEIGHT: 72 IN | HEART RATE: 101 BPM | WEIGHT: 173.31 LBS | SYSTOLIC BLOOD PRESSURE: 114 MMHG | DIASTOLIC BLOOD PRESSURE: 71 MMHG | TEMPERATURE: 98 F | BODY MASS INDEX: 23.47 KG/M2

## 2017-12-21 DIAGNOSIS — F41.9 ANXIETY: ICD-10-CM

## 2017-12-21 DIAGNOSIS — F17.200 SMOKER: ICD-10-CM

## 2017-12-21 DIAGNOSIS — Z79.60 LONG-TERM USE OF IMMUNOSUPPRESSANT MEDICATION: ICD-10-CM

## 2017-12-21 DIAGNOSIS — R06.02 SHORTNESS OF BREATH: ICD-10-CM

## 2017-12-21 DIAGNOSIS — E46 MALNUTRITION, UNSPECIFIED TYPE: ICD-10-CM

## 2017-12-21 DIAGNOSIS — K50.014 CROHN'S DISEASE OF SMALL INTESTINE WITH ABSCESS: ICD-10-CM

## 2017-12-21 DIAGNOSIS — L40.9 PSORIASIS: ICD-10-CM

## 2017-12-21 DIAGNOSIS — K50.014 CROHN'S DISEASE OF SMALL INTESTINE WITH ABSCESS: Primary | ICD-10-CM

## 2017-12-21 DIAGNOSIS — L98.8 FISTULA: ICD-10-CM

## 2017-12-21 DIAGNOSIS — R19.7 DIARRHEA, UNSPECIFIED TYPE: ICD-10-CM

## 2017-12-21 PROBLEM — N50.819 TESTICULAR PAIN: Status: RESOLVED | Noted: 2017-11-28 | Resolved: 2017-12-21

## 2017-12-21 LAB
ALBUMIN SERPL BCP-MCNC: 3.1 G/DL
ALP SERPL-CCNC: 64 U/L
ALT SERPL W/O P-5'-P-CCNC: 24 U/L
ANION GAP SERPL CALC-SCNC: 7 MMOL/L
AST SERPL-CCNC: 17 U/L
BASOPHILS # BLD AUTO: 0.05 K/UL
BASOPHILS NFR BLD: 0.7 %
BILIRUB SERPL-MCNC: 0.3 MG/DL
BUN SERPL-MCNC: 14 MG/DL
CALCIUM SERPL-MCNC: 9.1 MG/DL
CHLORIDE SERPL-SCNC: 101 MMOL/L
CO2 SERPL-SCNC: 32 MMOL/L
CREAT SERPL-MCNC: 1.1 MG/DL
DIFFERENTIAL METHOD: ABNORMAL
EOSINOPHIL # BLD AUTO: 0.6 K/UL
EOSINOPHIL NFR BLD: 8.2 %
ERYTHROCYTE [DISTWIDTH] IN BLOOD BY AUTOMATED COUNT: 13.6 %
EST. GFR  (AFRICAN AMERICAN): >60 ML/MIN/1.73 M^2
EST. GFR  (NON AFRICAN AMERICAN): >60 ML/MIN/1.73 M^2
GLUCOSE SERPL-MCNC: 89 MG/DL
HCT VFR BLD AUTO: 37.7 %
HGB BLD-MCNC: 12.4 G/DL
IMM GRANULOCYTES # BLD AUTO: 0.04 K/UL
IMM GRANULOCYTES NFR BLD AUTO: 0.6 %
LYMPHOCYTES # BLD AUTO: 1.2 K/UL
LYMPHOCYTES NFR BLD: 16.1 %
MCH RBC QN AUTO: 30.5 PG
MCHC RBC AUTO-ENTMCNC: 32.9 G/DL
MCV RBC AUTO: 93 FL
MONOCYTES # BLD AUTO: 0.7 K/UL
MONOCYTES NFR BLD: 9.7 %
NEUTROPHILS # BLD AUTO: 4.7 K/UL
NEUTROPHILS NFR BLD: 64.7 %
NRBC BLD-RTO: 0 /100 WBC
PLATELET # BLD AUTO: 207 K/UL
PMV BLD AUTO: 8.5 FL
POTASSIUM SERPL-SCNC: 3.9 MMOL/L
PROT SERPL-MCNC: 6.3 G/DL
RBC # BLD AUTO: 4.07 M/UL
SODIUM SERPL-SCNC: 140 MMOL/L
WBC # BLD AUTO: 7.21 K/UL

## 2017-12-21 PROCEDURE — 99215 OFFICE O/P EST HI 40 MIN: CPT | Mod: S$GLB,,, | Performed by: NURSE PRACTITIONER

## 2017-12-21 PROCEDURE — 99999 PR PBB SHADOW E&M-EST. PATIENT-LVL IV: CPT | Mod: PBBFAC,,, | Performed by: NURSE PRACTITIONER

## 2017-12-21 PROCEDURE — 36415 COLL VENOUS BLD VENIPUNCTURE: CPT

## 2017-12-21 PROCEDURE — 85025 COMPLETE CBC W/AUTO DIFF WBC: CPT

## 2017-12-21 PROCEDURE — 80053 COMPREHEN METABOLIC PANEL: CPT

## 2017-12-21 RX ORDER — PREDNISONE 10 MG/1
10 TABLET ORAL DAILY
Qty: 14 TABLET | Refills: 0 | Status: SHIPPED | OUTPATIENT
Start: 2017-12-21

## 2017-12-21 NOTE — PROGRESS NOTES
[facility]             Inflammatory Bowel Disease Follow-up  Note            Tricia Moreno MD & GUS Khan  TODAY'S VISIT DATE:  12/21/2017    Chief Complaint:   Chief Complaint   Patient presents with    Crohn's Disease     PCP: Phi Martin    Previous History:  Al Baker is a 42 y.o. male with initial history of Ulcerative Colitis now with Crohn's disease of the J pouch s/p 3 step restorative proctocolectomy with IPAA (2/17/2005, 7/2005, 9/30/2005) due to toxic megacolon with acute perforation.  He has a past medical history of psoriasis of elbows (diagnosed mid 10/2017).  He began with symptoms of bloody diarrhea in 1996 and was diagnosed via colonoscopy in [place] (we do not have these records).  He was on no maintenance medications at that time due to admitted non-compliance and recalls being on prednisone courses a few times a year which helped.  In 2/2005 his symptoms worsened with bloody diarrhea, abdominal pain, and weight loss but was treated conservatively.  48 hours later, he presented to [facility] with severe abdominal pain and was diagnosed with toxic megacolon with acute perforation confirmed by CT scan.  On 2/17/05 patient underwent a total abdominal colectomy with Vera pouch with end ileostomy. Pathology showed chronic ulcerative with focally active state and appendix with inflammatory changes c/w with UC.  He hospital stay was approximately 45 days and complications included ileus and a right paracolic gutter and rectovesical pouch fluid collection confirmed on CT in 3/2005 requiring an ex lap for eval of abscesses and endoloop abscesses.  These were treated with ancef and flagyl post-op.  In 7/2005 he underwent his second stage of the operation creating the J pouch and a proctectomy.  Then on 9/30/2005, he had a ileostomy takedown with SB resection (op report not available, info from clinic notes).  He did well from 0780-0613 except for yearly  "minor flares of "pouchitis" with symptoms of diarrhea, bloating, and abdominal cramping that was treated with a short course of flagyl.  This treatment resolved his symptoms quickly.  Unfortunately in summer of 2015, he developed an allergy to flagyl (rash) that had to be treated with prednisone.  He has taken cipro for his pouchitis symptoms but this has not worked as well as flagyl.  In 2/2015 had a significant amount of stressors due to wife having medical issues and son being premature causing worsening symptoms that was abated with a few course of cipro and prednisone though his symptoms never completely resolved.  In fall 2016, he began with some rectal pain that was thought to be due to hemorrhoids and was recommended to take cream and sitz baths.  By 12/2016 he had continued rectal pain, anal leakage of pus and blood, hip pain, and chils causing a suspicion for a perianal abscess. He had a flex sig the same day as his clinic appt on 12/7/16 which showed significant ulceration and bleeding in the J pouch body involving the anastomosis and neoterminal ileum (per notes 30 cm).  He started remicade 5 mg/kg with induction followed by maintenance in 2/2017 and has completed 6 infusions with his last on 10/13/2017.  He was having symptoms 2 weeks prior to a remicade infusion so his frequency was decreased to every 6 weeks remaining at 5 mg/kg.  He had some swelling to his eye after a remicade infusion that was treated with PO benadryl and resolved.  His fistula closed in 3/2017 but reopened in 4/2017 and he had a recurrent perianal abscess in 5/2017 that was treated with Cipro.  It was incised and drained in the office by Dr. Orantes in 7/2017.   MRI abd/pelvis on 7/29/17 showed right anterolateral perianal fistula terminating in the abscess int he anteromedial right gluteal fold.  He was referred to Dr. Chavez for a surgical opinion in 8/2017 and had an EUA with abscess drainage and placement of 2 setons on " "8/1/2017.  Since the EUA and seton placement, he has had 3 recurrent abscesses that has been treated with 2 courses of Bactrim and current treatment of Augmentin.  With his remicade infusion on 10/13/2017, he had immediate SOB and "hot" feeling after his initial IV infiltrated and was restarted in the opposite arm.  The reaction was treated with IV benadryl and IV steroids and the symptoms resolved.  Pouchoscopy on 11/18/2017 showed a perianal fistula with setons intact with no abscess found on perianal exam and moderately active inflammation of the J pouch and neoterminal ileum consistent with Crohn's disease of the J pouch.  Biopsies were consistent with chronic active enteritis.  [facility] IFX levels on 11/12/2017 were < 0.4 with ABs of [id number] so we stopped remicade with his last infusion on 11/16/2017 and planned for a treatment change.      Interval History:  - current IBD meds: humira started 12/2/2017, last dose 12/16/2017, next dose due 12/30/2017 (1st maintenance dose), prednisone 30 mg PO daily (due to taper down to 20 mg 12/22/2017--currently tapering by 10 mg every 7 days), MTX 12.5 mg PO daily  - 8 loose Bowel Movements/day 2 with blood (intermittently) and 2-3 nocturnal BMs  - 11/30/2017: admitted to [facility] for abdominal pain and nausea, given IVF, IV steroids; discharged 12/3/2017; RX prednisone, Augmentin BID for 14 days  - 2 lb weight gain weight since last clinic visit  - SOB/cough: thinks it is anxiety and some asthma, no CP, palpitations, tachycardia, or leg swelling, no fever; SPO2 97%,  on triage, 90s on auscultation  - GERD: occasional, worse with certain foods, relieved with OTC zantac and rolaids   - joint pain (left hip)/lower/upper back pain: bilateral shoulder, using ice packs with some relief  - Depression/Anxiety: on PRN Xanax, worse with disease progression  - Prednisone SE: Insomnia, Anxiety, Hunger  - constitutional/GI symptoms: no fevers/chills, weight loss, dysphagia, abdominal " pain  - extraintestinal manifestations: no eye pain/redness, skin lesions/rashes, liver problems, hematuria/dysuria    Prior Pertinent Surgeries:   2005: Total abdominal colectomy with Vera pouch and ileostomy (path: chronic ulcerative colitis, focally in an active state; benign reactive lymph nodes; margins clear of significant pathology; appendix with focal inflammatory changes consistent with UC; no dysplasia)  3/2/2005: ex lap eval of abscesses and endoloop abscesses (path-peritoneal rind tissue: multiple fragments of fibron mixed with acute inflammatory cell exudate and fibrofatty connective tissue)  2005: take down closure of loop ileostomy, small bowel resection  2017: EUA with placement of seton Xs 2    Pertinent Endoscopy/Imagin2005 CT abd/pelvis: UC involvement throughout the colon from the cecum to the distal descending colon at the junction of the sigmoid colon; not as much changes seen involving the sigmoid colon and the rectum is seen when compared to the rest of the colon   2005 KUB: gas within transverse colon, descending colon, and sigmoid colon with not as much intestinal gas seen when compared to previous study which could be related to decrease or resolving ileus; transverse and sigmoid demonstrate findings which could be related to UC  2005 KUB: gaseous distention of large and small bowel loops with air fluid levels, more numerous than on the previous exam  3/1/2005 CT abd/pelvis: s/p total colectomy and RLQ enterostomy; right paracolic gutter and rectovesical pouch fluid collection  2016 Flex Sig: continuous area of bleeding ulcerated mucosa with stigmata of recent bleeding present in the pouch; a continuous area of ulcerated mucosa with no stigmata of recent bleeding at the anastomosis and neoterminal ileum to 30 cm; healing perirectal abscess (path-neoterminal ileum: chronic active ileitis with ulceration)  2017 MRI abd/pelvis: right  anterolateral perianal fistula terminating in abscess in the anteromedial right gluteal fold  11/8/2017 Pouchoscopy: Perianal fistula with setons intact and no abscess found on perianal exam; moderately active inflammation of the J pouch and neoterminal ileum consistent with Crohn's disease of the J pouch (path-neoterminal ileum: Moderate to severe active enteritis with fibrinopurulent exudate) (path-pouch body: Mild to moderate chronic active enteritis)    Pertinent Labs:  7/14/2017: BUN 10, Creatinine 1.12, Albumin 3.4, Total bili 0.2, alk phos 65, AST 22, ALT 27, WBC 9.7, RBC 4.47, Hgb 13.9, Hct 41.3, MCV 92.5, platelets 278, CRP 1.95 (<0.80)  11/12/2017: [facility] Trough IFX level < 0.4, ABs 31460  No results found for: STOOLCULTURE, [id number], DCSILPOSTP5A, CDIFFICILEAN, CDIFFTOX, CDIFFICILEBY  Lab Results   Component Value Date    CRP 3.3 11/07/2017     Lab Results   Component Value Date    [id number] 33 11/07/2017     Lab Results   Component Value Date    HEPBCAB Negative 11/07/2017     Lab Results   Component Value Date    VARICELLAZOS 4.54 (H) 11/07/2017    VARICELLAINT Positive (A) 11/07/2017     Lab Results   Component Value Date    NIL 0.051 11/07/2017    TBAG 0.044 11/07/2017    TBAGNIL -0.007 11/07/2017    MITOGENNIL >10.000 11/07/2017    TBGOLD Negative 11/07/2017     No results found for: TPMTRESULT  No results found for: ANSADAINIT, INFLIXIMAB, INFLIXINTERP    Prior IBD Meds:  Flagyl (allergy)  Cipro  Prednisone  Remicade 5 mg/kg every 6 weeks  Entocort 9 mg PO daily    Current IBD Meds:  Humira 40 mg SC every 14 days, started 12/2/2017  MTX 12.5 mg PO every 7 days  Prednisone 30 mg PO     Vaccinations: Continues ID f/u for vaccinations  Flu shot: 12/2/2017  Chickenpox status/Varicella vaccine:  Lab Results   Component Value Date    VARICELLAZOS 4.54 (H) 11/07/2017    VARICELLAINT Positive (A) 11/07/2017   Tetanus: 10/2/2008  Pneumonia 13 (PCV13) vaccine:  Pneumonia 23 (PPSV23)  vaccine:  Hepatitis B: 12/6/2017  Lab Results   Component Value Date    HEPBSAB Negative 11/07/2017   Hepatitis A: 12/6/2017  HPV: NA   Meningococcal: NA     NSAID use/indication:  Ibuprofen once a week--600 mg     Narcotic use:  none    Alternative/Complementary Meds for IBD:  Probiotics, MVI, vit C, vitamins to boost immune system    Review of Systems   Constitutional: Negative for chills, fever and weight loss.   HENT:        No oral ulcers, dysphagia, oral thrush   Eyes: Negative for blurred vision, pain and redness.   Respiratory: Positive for cough and shortness of breath.    Cardiovascular: Negative for chest pain.   Gastrointestinal: Positive for heartburn. Negative for abdominal pain, nausea and vomiting.   Genitourinary: Negative for dysuria and hematuria.   Musculoskeletal: Positive for back pain and joint pain.   Skin: Negative for rash.   Psychiatric/Behavioral: Positive for depression. The patient is nervous/anxious. The patient does not have insomnia.      All Medical History/Surgical History/Family History/Social History/Allergies have been reviewed and updated in EMR    Review of patient's allergies indicates:   Allergen Reactions    Flagyl [metronidazole hcl] Hives     Outpatient Prescriptions Marked as Taking for the 12/21/17 encounter (Office Visit) with WINNIE Carmen   Medication Sig Dispense Refill    adalimumab (HUMIRA PEN) PnKt injection Inject 0.8 mLs (40 mg total) into the skin every 14 (fourteen) days. 2 each 5    ALPRAZolam (XANAX) 0.25 MG tablet Take 0.5 mg by mouth 3 (three) times daily.       dextroamphetamine-amphetamine (ADDERALL) 15 mg tablet Take by mouth.      diphenoxylate-atropine 2.5-0.025 mg (LOMOTIL) 2.5-0.025 mg per tablet Take 1 tablet by mouth 2 (two) times daily.  0    folic acid (FOLVITE) 1 MG tablet Take 1 tablet (1 mg total) by mouth once daily. 30 tablet 11    methotrexate 2.5 MG Tab Take 5 tablets (12.5 mg total) by mouth every 7 days. 20 tablet 2     predniSONE (DELTASONE) 10 MG tablet 30 mg.        Vital Signs:  Vitals:    12/21/17 1326   BP: 114/71   Pulse: 101   Temp: 98 °F (36.7 °C)   TempSrc: Oral   Weight: 78.6 kg (173 lb 4.5 oz)   Height: 6' (1.829 m)   PainSc: 0-No pain     Physical Exam   Constitutional: He is oriented to person, place, and time. He appears well-developed.   HENT:   Mouth/Throat: No oral lesions.   Eyes: Conjunctivae and EOM are normal. Pupils are equal, round, and reactive to light.   Cardiovascular: Normal rate and regular rhythm.    90s on auscultation   Pulmonary/Chest: Effort normal and breath sounds normal.   Abdominal: Soft. There is no tenderness.   Musculoskeletal:        Right lower leg: He exhibits no swelling.        Left lower leg: He exhibits no swelling.   Neurological: He is alert and oriented to person, place, and time.   Skin: No rash noted.   Psychiatric: He has a normal mood and affect.   Nursing note and vitals reviewed.    Labs: Reviewed and pertinent noted above    Assessment/Plan:  Al Baker is a 42 y.o. male with initial history of Ulcerative Colitis now with Crohn's disease of the J pouch s/p 3 step restorative proctocolectomy with IPAA (2/17/2005, 7/2005, 9/30/2005) due to toxic megacolon with acute perforation.  He has a past medical history of psoriasis of elbows (diagnosed mid 10/2017).  He stopped remicade in 11/2017 and started Humira on 12/2/2017 with induction and is due for his first maintenance dose on 12/30/2017.  He had a hospitalization at [facility] from 11/30/2017-12/3/2017 for abdominal pain and diarrhea and was treated with IV pain medications and IV steroids.  Symptoms improved and he was discharged home on a prednisone taper and augmentin for 14 days.  He reports a significant improvement in the abdominal pain, his stool frequency and stool consistency have also improved though we are unsure if this is all due to the prednisone or humira or a combination of both.  He is on prednisone  20 mg daily with plan to taper prednisone by 5 mg every 7 days to give humira time to work and if at any time symptoms worsen, the prednisone dose may need to be increased to give humira a little more time to take over for maintenance.  He will continue oral MTX with daily folic acid for immunogenicity prevention.  We recommend trough [facility] levels and ABs for further humira optimization and to assess for ABs and a repeat pouchoscopy in 4-6 months after starting humira to assess for response to medication.  Dr. Moreno will coordinate care with Dr. Martin and send him our recommendations and he will continue to follow up with Dr. Martin.     # Crohn's disease of the J pouch (Initial history of Ulcerative Colitis now with Crohn's disease of the J pouch s/p 3 step restorative proctocolectomy with IPAA (2/17/2005, 7/2005, 9/30/2005) due to toxic megacolon with acute perforation):    - continue humira 40 mg SC every 14 days, due 12/30/2017 (1st maintenance dose)  - continue oral MTX 12.5 mg weekly at bedtime with daily folic acid 1 mg  - continue prednisone 20 mg PO tomorrow then taper by 5 mg every 7 days  - recommend [facility] trough Humira levels at week 12--2/23/2018 to assess for further optimization of drug and to assess for antibodies  - repeat pouchoscopy 4-6 months after starting Humira April-June 2018 to assess response to medication  - continue to try to stop smoking given smoking can worsen Crohn's and make him more resistant to Crohn's related treatments  - drug monitoring labs: CBC/CMP today and in 2 weeks, then in a month, then every 3 months for MTX monitoring, TB Quantiferon negative 11/2017, TPMT normal 11/2017, Hepatitis testing negative 11/2017    # Malnutrition/Weight loss  - 2 lb weight gain since last clinic visit  - High calorie, low residue diet  - Dietician referral placed--patient has not scheduled  - appetite has improved  - ensure/boost recommended--3 cans/day--do not replace meals    #  Diarrhea stool of varying consistency:   - infectious causes unlikely due to varying consistency and stable symptoms so cultures and c diff--not ordered  - TTG IgA and total serum IgA negative 11/2017  - TSH  Normal 11/2017  - CMV negative 11/2017    SOB/cough:   - thinks it is anxiety and some asthma (symptoms worse with anxiety)  - no CP, palpitations, tachycardia, wheezing or leg swelling  - no fever  - SPO2 97%,  on triage, 90s on auscultation  - no S&S of heart failure on exam  - patient will f/u with PCP    # Perianal abscess  - no active abscess at present  - 2 setons in place  - call immediately if abscess returns, will consider MRI pelvis for evaluation  - use oxycodone sparingly--discussed the increase of mortality in Crohn's patients with narcotic use    # Psoriasis possibly remicade induced but mild and being treated  - 2 small patches on bilateral elbows  - continue topical treatment per derm  - continue derm f/u    # Smoker:   - recommended to stop and come up with a plan prior to next visit  - has decreased considerably  - discussed in detail that Crohn's disease can worsen with smoking and may make patient more resistant to treatment    # IBD specific health maintenance-long term immunosuppression:  Colorectal cancer risk:    Risks factors: none-average risk  - colonoscopy:  periodic surveillance of rectal cuff    Opthamologic exam recommended yearly    Dermatologic exam recommended yearly     Bone health:  Calcium 1200 mg daily and vitamin D3 1000 IU daily  Risk of osteopenia/osteoporosis:  Risks factors: steroid use > 3 months  Vitamin D:   Lab Results   Component Value Date    [id number] 33 11/07/2017   - Dexa scan once off of prednisone    Vaccine counseling:   - No LIVE VACCINES--reminded in clinic today    Follow up: with Dr. Martin in 2-3 weeks    Total visit time was 40 minutes, more than 50% of which was spent in face-to-face counseling with patient regarding evaluation, management  goals, and treatment options for Crohn's disease    WINNIE Carmen-C  Department of Gastroenterology  Inflammatory Bowel Disease Program    I have personally performed a face to face diagnostic evaluation on this patient. I have reviewed and agree with today's findings and the care plan outlined by DARIEL Khan MD   Department of Gastroenterology  Medical Director, Inflammatory Bowel Disease

## 2017-12-21 NOTE — LETTER
December 26, 2017        Phi Martin MD  [address]  [place] [address]             [facility]enter[facility]  [address]  [place] [address]  Phone: [phone]  Fax: [phone]   Patient: Al Baker   MR Number: [id number]   YOB: 1975   Date of Visit: 12/21/2017       Dear Dr. Martin:    Thank you for referring Al Baker to me for evaluation. Attached you will find relevant portions of my assessment and plan of care.    If you have questions, please do not hesitate to call me. I look forward to following Al Baker along with you.    Sincerely,      WINNIE Carmen            CC  No Recipients    Enclosure

## 2017-12-21 NOTE — PATIENT INSTRUCTIONS
Instructions:  - labs today (CBC/CMP)--will need repeat in 2 weeks for MTX monitoring  - continue Humira 40 mg SC every 14 days, due 12/30/2017  - continue MTX 12.5 mg every 7 days at bedtime with folic acid 1 mg PO daily  - continue to taper prednisone to 20 mg PO tomorrow then begin to taper by 5 mg every 7 days  - need repeat pouchoscopy in 4-6 months  - recommend [facility] Humira levels and ABs 12 weeks after starting Humira due 2/23/2018  - Continue Calcium 1200 mg daily and vitamin D3 1000 IU daily  - Avoid all NSAIDs (Advil, Ibuprofen, Motrin, Aspirin, Naprosyn, Aleve)  - Yearly Eye exam  - Yearly Skin exam--wear sun block and hats  - Use antibiotics with caution  - Vaccines--continue ID f/u for vaccinations  - Follow up with Dr. Martin in 2-3 weeks (1st, 2nd week in January)--send us an email to let us know about your visit

## 2018-02-21 ENCOUNTER — TELEPHONE (OUTPATIENT)
Dept: GASTROENTEROLOGY | Facility: CLINIC | Age: 43
End: 2018-02-21
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
## 2018-03-23 ENCOUNTER — TELEPHONE (OUTPATIENT)
Dept: GASTROENTEROLOGY | Facility: CLINIC | Age: 43
End: 2018-03-23

## 2018-03-23 NOTE — TELEPHONE ENCOUNTER
Pt called my direct line  He stated he got a call from Dr Martin's nurse asking him to check with us to see when the Auth for his Humira expires so they can make sure there is no lapse.    I checked his script and saw we wrote for 5 refills in Nov 2017 so he will be out of refills around April or May from the script we wrote  So they need to start working on getting it prescribed under Dr Martin.  He stated he would let them know.

## 2018-05-29 ENCOUNTER — TELEPHONE (OUTPATIENT)
Dept: GASTROENTEROLOGY | Facility: CLINIC | Age: 43
End: 2018-05-29

## 2018-05-29 NOTE — TELEPHONE ENCOUNTER
----- Message from Lin Jokert sent at 5/29/2018  1:29 PM CDT -----  Contact: jennifer/jesse spivey - [employer] - [phone]  mark - needs refills on humira - please call jennifer/jesse spivey - [employer] - [phone]